# Patient Record
Sex: MALE | Race: WHITE | Employment: FULL TIME | URBAN - METROPOLITAN AREA
[De-identification: names, ages, dates, MRNs, and addresses within clinical notes are randomized per-mention and may not be internally consistent; named-entity substitution may affect disease eponyms.]

---

## 2017-06-05 ENCOUNTER — OFFICE VISIT - HEALTHEAST (OUTPATIENT)
Dept: INTERNAL MEDICINE | Facility: CLINIC | Age: 37
End: 2017-06-05

## 2017-06-05 DIAGNOSIS — G89.29 CHRONIC NECK PAIN: ICD-10-CM

## 2017-06-05 DIAGNOSIS — M54.2 CHRONIC NECK PAIN: ICD-10-CM

## 2017-06-05 ASSESSMENT — MIFFLIN-ST. JEOR: SCORE: 1703.68

## 2018-03-08 ENCOUNTER — OFFICE VISIT - HEALTHEAST (OUTPATIENT)
Dept: FAMILY MEDICINE | Facility: CLINIC | Age: 38
End: 2018-03-08

## 2018-03-08 DIAGNOSIS — J10.1 INFLUENZA B: ICD-10-CM

## 2018-03-08 DIAGNOSIS — R05.9 COUGH: ICD-10-CM

## 2018-03-08 LAB
FLUAV AG SPEC QL IA: ABNORMAL
FLUBV AG SPEC QL IA: ABNORMAL

## 2019-05-20 ENCOUNTER — OFFICE VISIT - HEALTHEAST (OUTPATIENT)
Dept: FAMILY MEDICINE | Facility: CLINIC | Age: 39
End: 2019-05-20

## 2019-05-20 DIAGNOSIS — Z20.818 EXPOSURE TO STREP THROAT: ICD-10-CM

## 2019-05-20 DIAGNOSIS — J02.9 ACUTE PHARYNGITIS, UNSPECIFIED ETIOLOGY: ICD-10-CM

## 2019-05-20 LAB — DEPRECATED S PYO AG THROAT QL EIA: NORMAL

## 2019-05-21 ENCOUNTER — COMMUNICATION - HEALTHEAST (OUTPATIENT)
Dept: FAMILY MEDICINE | Facility: CLINIC | Age: 39
End: 2019-05-21

## 2019-05-21 LAB — GROUP A STREP BY PCR: ABNORMAL

## 2021-05-24 ENCOUNTER — RECORDS - HEALTHEAST (OUTPATIENT)
Dept: ADMINISTRATIVE | Facility: CLINIC | Age: 41
End: 2021-05-24

## 2021-05-28 NOTE — PROGRESS NOTES
Walk In Care Note                                                                                 Date of Visit: 5/20/2019     Chief Complaint   Tu Mora is a(n) 38 y.o. White or  male who presents to Walk In [facility] with the following complaint(s):  Fever (x1 day) and Sore Throat       Assessment and Plan   1. Acute pharyngitis, unspecified etiology  - Rapid Strep A Screen-Throat swab  - amoxicillin (AMOXIL) 500 MG capsule; Take 1 capsule (500 mg total) by mouth 2 (two) times a day for 10 days. Take with food.  Dispense: 20 capsule; Refill: 0  - Group A Strep, RNA Direct Detection, Throat    2. Exposure to strep throat  - amoxicillin (AMOXIL) 500 MG capsule; Take 1 capsule (500 mg total) by mouth 2 (two) times a day for 10 days. Take with food.  Dispense: 20 capsule; Refill: 0      Strep screen negative; reflex testing in process. Discussed viral versus bacterial etiology of current symptoms. Constellation of symptoms and current examination findings are concerning for Group A Strep pharyngitis despite negative strep screen. Also has known exposure to strep (daughter was diagnosed earlier today). Discussed risks of antibiotic therapy, including diarrhea, allergic reaction, rash in the setting of possible viral illness, and treatment failure. Patient was agreeable to proceeding with antibiotic therapy. Treating with amoxicillin as listed above for presumed streptococcal pharyngitis. Recommended use of a probiotic due to risk of diarrhea. Discussed symptomatic / supportive cares.     Counseled patient regarding assessment and plan for evaluation and treatment. Questions were answered. See AVS for the specific written instructions and educational handout(s) regarding presumed strep that were provided at the conclusion of the visit.     Discussed signs / symptoms that warrant urgent / emergent medical attention.     Follow up as needed.      History of Present Illness   Primary symptom: Sore  throat  Onset: 1 day(s) ago  Progression: Worsening  Hoarseness: Yes  Dysphagia: Yes  Drooling: No  Neck swelling: No  Fevers: Yes  Chills: Yes  Headache: Yes  Rash: No  Abdominal pain: No  Upper respiratory symptoms: None  Additional symptoms: Body aches  Home therapies utilized: Ibuprofen  History of recurrent strep: No  History of peritonsillar abscess: No  Exposure to strep: Yes, daughter was diagnosed earlier today  Exposure to mono: No  Tobacco use / exposure: No     Review of Systems   Review of Systems   All other systems reviewed and are negative.       Physical Exam   Vitals:    05/20/19 1541   BP: 104/72   Patient Site: Right Arm   Patient Position: Sitting   Cuff Size: Adult Large   Pulse: 77   Resp: 16   Temp: 98.6  F (37  C)   TempSrc: Oral   SpO2: 96%   Weight: 190 lb (86.2 kg)     Physical Exam   Constitutional: He is oriented to person, place, and time. He appears well-developed and well-nourished.  Non-toxic appearance. No distress.   HENT:   Head: Normocephalic and atraumatic.   Right Ear: Tympanic membrane, external ear and ear canal normal.   Left Ear: Tympanic membrane, external ear and ear canal normal.   Nose: No mucosal edema or rhinorrhea.   Mouth/Throat: Uvula is midline and mucous membranes are normal. No oral lesions. Oropharyngeal exudate and posterior oropharyngeal erythema present. Tonsils are 2+ on the right. Tonsils are 2+ on the left. Tonsillar exudate.   Eyes: Conjunctivae and lids are normal.   Neck: Neck supple. No edema and no erythema present.   Cardiovascular: Normal rate, regular rhythm, S1 normal and S2 normal. Exam reveals no gallop and no friction rub.   No murmur heard.  Pulmonary/Chest: Effort normal and breath sounds normal. No stridor. He has no wheezes. He has no rhonchi. He has no rales.   Lymphadenopathy:        Head (right side): Tonsillar adenopathy present.        Head (left side): Tonsillar adenopathy present.     He has cervical adenopathy.        Right  cervical: Superficial cervical adenopathy present.        Left cervical: Superficial cervical adenopathy present.   Neurological: He is alert and oriented to person, place, and time.   Skin: Skin is warm and dry. No rash noted. No pallor.   Nursing note and vitals reviewed.       Diagnostic Studies   Laboratory:  Results for orders placed or performed in visit on 05/20/19   Rapid Strep A Screen-Throat swab   Result Value Ref Range    Rapid Strep A Antigen No Group A Strep detected, presumptive negative No Group A Strep detected, presumptive negative     Radiology:  N/A  Electrocardiogram:  N/A     Procedure Note   N/A     Pertinent History   The following portions of the patient's history were reviewed and updated as appropriate: allergies, current medications, past family history, past medical history, past social history, past surgical history and problem list.     Gustavo Schulte MD  [facility], [place]

## 2021-05-29 NOTE — TELEPHONE ENCOUNTER
----- Message from Spring Encinas CNP sent at 5/21/2019  1:03 PM CDT -----  Please let patient know overnight strep positive, but amoxicillin will cover.

## 2021-05-31 VITALS — HEIGHT: 67 IN | WEIGHT: 186.3 LBS | BODY MASS INDEX: 29.24 KG/M2

## 2021-06-01 VITALS — WEIGHT: 188 LBS | BODY MASS INDEX: 29.44 KG/M2

## 2021-06-03 VITALS — WEIGHT: 190 LBS | BODY MASS INDEX: 29.76 KG/M2

## 2021-06-11 NOTE — PROGRESS NOTES
"Chief Complaint   Patient presents with     Neck Pain     x 2 months worse past week, neck hurts all over       HISTORY OF PRESENT ILLNESS:  36-year-old male seen with bilateral and posterior neck pain.  States this has been a chronic recurrent complaint  Since approximately 2007 with the last 2 months chronic pain has worsened.  Denies recent trauma.  He has been taking ibuprofen which provides some relief.  He applies heat as needed.  Any movement of the neck including  Side bending, rotation, flexion and extension makes pain worse.  Pain does not radiate into his scalp.   denies pain into shoulders or either arm.    Allergies   Allergen Reactions     Cat Dander          History   Smoking Status     Never Smoker   Smokeless Tobacco     Not on file       Vitals:    06/05/17 1000   BP: 122/84   Pulse: 79   SpO2: 97%   Weight: 186 lb 4.8 oz (84.5 kg)   Height: 5' 7\" (1.702 m)       PHYSICAL EXAM:  GENERAL:Alert, respirations non-labored, no pain distress noted, speech clear  HEENT:Mouth and throat normal. TMs normal bilaterally. Eyes normal. Head atraumatic  NECK:No adenopathy. Diffuse tenderness laterally right and left and tenderness over most spinous processes of c-spine  CARDIOVASCULAR:Regular S1 and S2  LUNGS:Clear bilaterally       IMPRESSION:  Acute on chronic neck pain    PLAN:  Referral to physical therapy.  I suggested he be seen for recheck after 4 weeks  And if not responding to treatment consider MRI of cervical spine.  Continue heat.  Continue ibuprofen no more than 800 mg per dose no more than 4 times per day and only  As much as needed          "

## 2021-06-16 NOTE — PROGRESS NOTES
Assessment:     1. Cough  Influenza A/B Rapid Test   2. Influenza B  oseltamivir (TAMIFLU) 75 MG capsule     Results for orders placed or performed in visit on 03/08/18   Influenza A/B Rapid Test   Result Value Ref Range    Influenza  A, Rapid Antigen No Influenza A antigen detected No Influenza A antigen detected    Influenza B, Rapid Antigen Influenza B antigen detected (!) No Influenza B antigen detected        Plan:     -Discussed results with the patient.  With treat him for influenza with Tamiflu BID X 5 days.  Advised patient to take medications as prescribed.  Increase fluid intake.  May take ibuprofen or tylenol for pain or fever.  Go to ED if his symptoms worsens.      Subjective:       37 y.o. male presents for evaluation of sore throat, wheezing, and coughing.  Patient reports that he spiked a fever 102 yesterday.  His symptoms started Monday night with a fever and body aches started yesterday.  He denies nausea, vomiting, diarrhea, shortness of breath, or cough.  He denies difficulty with swallowing.  He has been using OTC medications with minimal symptom relief.  He reports that he was exposed to somebody at work who was sick but he did not know if she had influenza.      The following portions of the patient's history were reviewed and updated as appropriate: allergies, current medications, past family history, past medical history, past social history, past surgical history and problem list.    Review of Systems  A 12 point comprehensive review of systems was negative except as noted.     Objective:      /74 (Patient Site: Right Arm, Patient Position: Sitting, Cuff Size: Adult Large)  Pulse 82  Temp 99.6  F (37.6  C) (Oral)   Resp 20  Wt 188 lb (85.3 kg)  SpO2 98%  BMI 29.44 kg/m2  General appearance: alert, appears stated age, cooperative and moderate distress  Head: Normocephalic, without obvious abnormality, atraumatic, sinuses nontender to percussion  Eyes: conjunctivae/corneas clear.  PERRL, EOM's intact. Fundi benign.  Ears: normal TM's and external ear canals both ears  Nose: Nares normal. Septum midline. Mucosa normal. No drainage or sinus tenderness.  Throat: lips, mucosa, and tongue normal; teeth and gums normal  Neck: no adenopathy, no carotid bruit, no JVD, supple, symmetrical, trachea midline and thyroid not enlarged, symmetric, no tenderness/mass/nodules  Lungs: clear to auscultation bilaterally  Heart: regular rate and rhythm, S1, S2 normal, no murmur, click, rub or gallop  Skin: Skin color, texture, turgor normal. No rashes or lesions  Lymph nodes: Cervical, supraclavicular, and axillary nodes normal.  Neurologic: Grossly normal     This note has been dictated using voice recognition software. Any grammatical or context distortions are unintentional and inherent to the software

## 2021-06-17 NOTE — PATIENT INSTRUCTIONS - HE
Patient Instructions by Gustavo Schulte MD at 5/20/2019  3:40 PM     Author: Gustavo Schulte MD Service: -- Author Type: Physician    Filed: 5/20/2019  4:07 PM Encounter Date: 5/20/2019 Status: Addendum    : Gustavo Schulte MD (Physician)    Related Notes: Original Note by Gustavo Schulte MD (Physician) filed at 5/20/2019  4:04 PM       - Rapid strep test is negative.   - A confirmatory strep test is in process and will be finalized tomorrow. We will only reach out to you if this test is positive.   - Take the full course of amoxicillin as prescribed for presumed strep throat.   - Take a probiotic while taking this antibiotic. This can be purchased over the counter at your local pharmacy.   - Discard your toothbrush 48 hours after starting your antibiotic to prevent reinfection.   - Take acetaminophen and / or ibuprofen as needed for pain and fever.   - May return to  / school / work 24 hours after starting the antibiotic listed above.      Patient Education     Pharyngitis: Strep (Presumed)    You have pharyngitis (sore throat). The healthcare staff think your sore throat is caused by streptococcus (strep) bacteria. This is often called strep throat. Strep throat can cause throat pain that is worse when swallowing, aching all over, headache, and fever. The infection is contagious. It may be spread by coughing, kissing, or touching others after touching your mouth or nose. Antibiotic medicine is given to treat the infection.  Home care    Rest at home. Drink plenty of fluids so you wont get dehydrated.    Stay home from work or school for the first 2 days of taking the antibiotics. After this time, you will not be contagious. You can then return to work or school if you are feeling better.     Take the antibiotic medicine for the full 10 days, even when you feel better. This is very important to make sure the infection is fully treated. It is also important to prevent medicine-resistant  germs from growing. If you were given an antibiotic shot, no more antibiotics are needed.    You may use acetaminophen or ibuprofen to control pain or fever, unless another medicine was prescribed for this. If you have chronic liver or kidney disease or ever had a stomach ulcer or GI bleeding, talk with your healthcare provider before using these medicines.    Use throat lozenges or a throat-numbing spray to help reduce throat pain. Gargling with warm salt water can also help reduce throat pain. Dissolve 1/2 teaspoon of salt in 1 glass of warm water.     Dont eat salty or spicy foods. These can irritate the throat.  Follow-up care  Follow up with your healthcare provider or our staff if you don't get better over the next week.  When to seek medical advice  Call your healthcare provider right away if any of these occur:    Fever as directed by your healthcare provider    New or worse ear pain, sinus pain, or headache    Painful lumps in the back of neck    Stiff neck    Lymph nodes that get larger    Cant swallow liquids, a lot of drooling, or cant open mouth wide due to throat pain    Signs of dehydration, such as very dark urine or no urine, sunken eyes, dizziness    Trouble breathing or noisy breathing    Muffled voice    New rash  Prevention  Here are steps you can take to help prevent an infection:    Keep good hand washing habits.    Dont have close contact with people who have sore throats, colds, or other upper respiratory infections.    Dont smoke, and stay away from secondhand smoke.    Stay up to date with of your vaccines.  Date Last Reviewed: 11/1/2017 2000-2017 [employer]. [address] [place] [address], [place] [address]. All rights reserved. This information is not intended as a substitute for professional medical care. Always follow your healthcare professional's instructions.

## 2022-05-13 ENCOUNTER — OFFICE VISIT (OUTPATIENT)
Dept: FAMILY MEDICINE | Facility: CLINIC | Age: 42
End: 2022-05-13
Payer: OTHER GOVERNMENT

## 2022-05-13 VITALS
HEART RATE: 78 BPM | SYSTOLIC BLOOD PRESSURE: 118 MMHG | DIASTOLIC BLOOD PRESSURE: 70 MMHG | WEIGHT: 196.4 LBS | BODY MASS INDEX: 30.76 KG/M2

## 2022-05-13 DIAGNOSIS — M25.561 CHRONIC PAIN OF RIGHT KNEE: ICD-10-CM

## 2022-05-13 DIAGNOSIS — G89.29 CHRONIC PAIN OF RIGHT KNEE: ICD-10-CM

## 2022-05-13 DIAGNOSIS — K52.9 CHRONIC DIARRHEA OF UNKNOWN ORIGIN: ICD-10-CM

## 2022-05-13 DIAGNOSIS — M21.611 BUNION OF GREAT TOE OF RIGHT FOOT: ICD-10-CM

## 2022-05-13 DIAGNOSIS — H60.393 INFECTIVE OTITIS EXTERNA, BILATERAL: ICD-10-CM

## 2022-05-13 DIAGNOSIS — H65.93 BILATERAL SEROUS OTITIS MEDIA, UNSPECIFIED CHRONICITY: ICD-10-CM

## 2022-05-13 DIAGNOSIS — H66.002 ACUTE SUPPURATIVE OTITIS MEDIA OF LEFT EAR WITHOUT SPONTANEOUS RUPTURE OF TYMPANIC MEMBRANE, RECURRENCE NOT SPECIFIED: Primary | ICD-10-CM

## 2022-05-13 PROCEDURE — 99215 OFFICE O/P EST HI 40 MIN: CPT | Performed by: FAMILY MEDICINE

## 2022-05-13 RX ORDER — FLUTICASONE PROPIONATE 50 MCG
1 SPRAY, SUSPENSION (ML) NASAL 2 TIMES DAILY
Qty: 16 G | Refills: 0 | COMMUNITY
Start: 2022-05-13

## 2022-05-13 RX ORDER — AMOXICILLIN 500 MG/1
1000 CAPSULE ORAL 2 TIMES DAILY
Qty: 40 CAPSULE | Refills: 0 | Status: SHIPPED | OUTPATIENT
Start: 2022-05-13 | End: 2022-05-23

## 2022-05-13 RX ORDER — CIPROFLOXACIN AND DEXAMETHASONE 3; 1 MG/ML; MG/ML
4 SUSPENSION/ DROPS AURICULAR (OTIC) 2 TIMES DAILY
Qty: 7.5 ML | Refills: 0 | Status: SHIPPED | OUTPATIENT
Start: 2022-05-13 | End: 2022-05-20

## 2022-05-13 NOTE — PATIENT INSTRUCTIONS
Start Flonase1 spray in each nostril in am and bedtime      Start claritin ( loratidine) 10 mg  a day for next 30 days    Stat ciprodex drops 3-4 drops 2 times a day for 7 days    Start amoxicillin 1000 mg 2 times a day for 10 days     The [facility] service should call you the next 1 to 2 days if they have not called you- call them at the number noted     Call and schedule an appointment with gastroenterology    Start trying to eliminate foods from diet to see if symptoms improve       External Ear Infection (Adult)    External otitis (also called  swimmer s ear ) is an infection in the ear canal. It's often caused by bacteria or fungus. It can occur a few days after water gets trapped in the ear canal (from swimming or bathing). It can also occur after cleaning too deeply in the ear canal with a cotton swab or other object. Sometimes, hair care products get into the ear canal and cause this problem.   Symptoms can include pain, fever, itching, redness, drainage, or swelling of the ear canal. Temporary hearing loss may also occur.   Home care  Don't try to clean the ear canal. This can push pus and bacteria deeper into the canal.  Use prescribed ear drops as directed. These help reduce swelling and fight the infection. If an ear wick was placed in the ear canal, apply drops right onto the end of the wick. The wick will draw the medicine into the ear canal even if it's swollen closed.  A cotton ball may be loosely placed in the outer ear to absorb any drainage.  You may use over-the-counter medicines to control pain as directed by the healthcare provider, unless another medicine was prescribed. Talk with your provider before using these medicines if you have chronic liver or kidney disease or ever had a stomach ulcer or digestive tract bleeding.  Don't allow water to get into your ear when bathing. Also don't swim until the infection has cleared.     Prevention  Keep your ears dry. This helps lower the risk of  infection. Dry your ears with a towel or hair dryer after getting wet. Also, use ear plugs when swimming.  Don't stick any objects in the ear to remove wax.  Talk with your provider about using ear drops to prevent swimmer's ear in case you feel water trapped in your ear canal. You can get these drops over the counter at most drugstores. They work by removing water from the ear canal.     Follow-up care  Follow up with your healthcare provider in 1 week, or as advised.   When to seek medical advice  Call your healthcare provider right away if any of these occur:   Ear pain becomes worse or doesn t improve after 3 days of treatment  Redness or swelling of the outer ear occurs or gets worse  Headache  Fever of 100.4 F (38 C) or higher, or as directed by your healthcare provider  Call 911  Call 911 or get immediate medical care if any of the following occur:   Seizure  Unusual drowsiness or confusion  Unusual painful or stiff neck     [employer] last reviewed this educational content on 8/1/2020 2000-2021 [employer]. All rights reserved. This information is not intended as a substitute for professional medical care. Always follow your healthcare professional's instructions.            Serous otitis media, fluid behind the ear drum    Earaches can happen without an infection. They can occur when air and fluid build up behind the eardrum. They may cause a feeling of fullness and discomfort. They may also impair hearing. This is called otitis media with effusion (OME) or serous otitis media. It means there is fluid in the middle ear. It is not the same as acute otitis media, which is often from an infection.  OME can happen when you have a cold if congestion blocks the passage that drains the middle ear. This passage is called the eustachian tube. OME may also occur with nasal allergies or after a bacterial infection in the middle ear. Other causes are:  Trauma  Improper cleaning of wax from the  ear  Bacterial infection of the mastoid bone (mastoiditis)  Tumor  Jaw pain  Changes in pressure, such as from flying or scuba diving    The pain or discomfort may come and go. You may hear clicking or popping sounds when you chew or swallow. You may feel that your balance is off. Or you may hear ringing in the ear.  It often takes from several weeks up to 3 months for the fluid to clear on its own. Oral pain relievers and ear drops help if there is pain. Decongestants and antihistamines sometimes help. Antibiotics don't help since there is no infection. Your healthcare provider may give you a nasal spray to help reduce swelling in the nose and eustachian tube. This can allow the ear to drain.  If your OME doesn't get better after 3 months, surgery may be used to drain the fluid. A small tube may also be put in the eardrum to help with drainage.  Because the middle ear fluid can become infected, watch for signs of an infection. These may develop later. They may include increased ear pain, fever, or drainage from the ear.  Home care  These home-care tips will help you take care of yourself:  You may use over-the-counter medicine as directed by your healthcare provider to control pain, unless medicine was prescribed. If you have chronic liver or kidney disease or ever had a stomach ulcer or GI bleeding, talk with your healthcare provider before using any medicines.  Aspirin should never be used in anyone younger than age 18 who has a fever. It may cause severe liver damage.  Ask your healthcare provider if you may use over-the-counter decongestants such as phenylephrine or pseudoephedrine. Keep in mind they are not always helpful.  Talk with your healthcare provider about using nasal spray decongestants. Don't use them for more than 3 days, or as directed by your healthcare provider. Longer use can make congestion worse. Prescription nasal sprays from your healthcare provider don't often have such  restrictions.  Antihistamines may help if you are also having allergy symptoms.  You may use medicines such as guaifenesin to thin mucus and help with drainage.  Follow-up care  Follow up with your healthcare provider or as advised if you are not feeling better after 3 days.  When to seek medical advice  Call your healthcare provider right away if any of these occur:  Ear pain that gets worse or that does not start to get better   Fever of 100.4 F (38 C) or higher, or as directed by your healthcare provider  Fluid or blood draining from the ear  Headache or sinus pain  Stiff neck  Unusual drowsiness or confusion  [employer] last reviewed this educational content on 12/1/2019 2000-2021 [employer]. All rights reserved. This information is not intended as a substitute for professional medical care. Always follow your healthcare professional's instructions.     Middle Ear Infection (Adult)   You have an infection of the middle ear, the space behind the eardrum. This is also called acute otitis media (AOM). Sometimes it's caused by the common cold. This is because congestion can block the internal passage (eustachian tube) that drains fluid from the middle ear. When the middle ear fills with fluid, bacteria can grow there and cause an infection. Oral antibiotics are used to treat this illness, not ear drops. Symptoms usually start to improve within 1 to 2 days of treatment.    Home care  The following are general care guidelines:  Finish all of the antibiotic medicine given, even though you may feel better after the first few days.  You may use over-the-counter medicine, such as acetaminophen or ibuprofen, to control pain and fever, unless something else was prescribed. Talk with your healthcare provider before using these medicines if you have chronic liver or kidney disease. Also talk with your provider if you have had a stomach ulcer or digestive bleeding. Don't give aspirin to anyone under 18 years of  age who has a fever. It may cause severe illness or death.  Follow-up care  Follow up with your healthcare provider in 2 weeks, or as advised, if all symptoms have not gotten better, or if hearing doesn't go back to normal within 1 month.  When to seek medical advice  Call your healthcare provider right away if any of these occur:  Ear pain gets worse or does not improve after 3 days of treatment  Unusual drowsiness or confusion  Neck pain, stiff neck, or headache  Fluid or blood draining from the ear canal  Fever of 100.4 F (38 C) or as advised   Seizure  [employer] last reviewed this educational content on 9/1/2019 2000-2021 [employer]. All rights reserved. This information is not intended as a substitute for professional medical care. Always follow your healthcare professional's instructions.

## 2022-05-13 NOTE — PROGRESS NOTES
Patient presents with:  Ear Problem: Discharge, pain       Subjective     Tu Mora is a 41 year old male who presents to clinic today for the following health issues:    HPI       Ear problem       Duration: 2 weeks    Description (location/character/radiation): bilateral drainage,itching, and crusting at entrance of ear canal  No history of issues with ears, no history of PE tubes as a child, left is worse than right, hearing muffled off and on,     Intensity:  moderate    Accompanying signs and symptoms: as noted     History (similar episodes/previous evaluation): None    Precipitating or alleviating factors: None    Therapies tried and outcome: q tip to clear drainage      Vaccinated and booster   covid symptoms a week after  wife and daughter positive for covid 4/2022 (patient did not test) ( 4yr old daughter positive -not vaccinated )      R knee pain    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, injected knee steroids no history of knee pain prior to active duty issue with knee since that time   until flare of right knee-2 years ago and has progressive worsening running 1-2 miles a day-daily pain started in the knee, decrease in running frequency of running pain started, can go as much as 5 miles now, with pain, 2 days a week   After run-takes ibuprofen, pain resolves,   No pain at rest  Increase pain inclines and stairs-the worst pain, mild pain with walking and standing      In addition, right great toe pain onset during active duty 2007, no pain at rest, pain with running, dorsiflexion of foot,no history of evaluation of great toe/pain     GI issues     Loose stools, within an hour after eating onset 2007 during time on active duty, ongoing symptoms and now  worse in last 2 years,  No blood stools, no melena  Occasional imodium-helps   Changing diet-noted with fried foods, red meat more likely to have urgency, has not tried an elimination diet,      History of GERD, onset during time of active duty 2007, started on omeprazole, medication controlled symptoms resolved after honorable discharge active duty [employer] 2013   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  Social History     Tobacco Use     Smoking status: Never Smoker     Smokeless tobacco: Never Used   Substance Use Topics     Alcohol use: Yes     Comment: 5 drinks weekly       Current Outpatient Medications   Medication Sig Dispense Refill     fluticasone (FLONASE) 50 MCG/ACT nasal spray Spray 1 spray into both nostrils 2 times daily 16 g 0     ibuprofen 200 MG capsule Take 200 mg by mouth every 4 hours as needed for fever. (Patient not taking: Reported on 5/25/2022) 120 capsule      Allergies   Allergen Reactions     Cat Dander [Animal Dander] Unknown         ROS are negative, except as otherwise noted HPI      Objective    /70   Pulse 78   Wt 89.1 kg (196 lb 6.4 oz)   BMI 30.76 kg/m    Body mass index is 30.76 kg/m .  Physical Exam   GENERAL: healthy, alert and no distress  EYES: Eyes grossly normal to inspection, PERRL and conjunctivae and sclerae normal  HENT: ear canals-red,tender bilateral L>R  TM left-red, dull, TM on right air fluid level/dull , nose congestion and mouth without ulcers or lesions  NECK: no adenopathy, no asymmetry,  and thyroid normal to palpation  RESP: lungs clear to auscultation - no rales, rhonchi or wheezes  CV: regular rate and rhythm, normal S1 S2, no S3 or S4, no murmur, click or rub, no peripheral edema and peripheral pulses strong  ABDOMEN: soft, nontender, no hepatosplenomegaly, no masses and bowel sounds normal  MS: no gross musculoskeletal defects noted, no edema  R knee-mild chronic OA changes/mild swelling, tender along joint space, FROM, mild pain with full  flexion, minimal pain with full extension, no effusion, instability, ligaments intact   R foot- great toe -deformity MTP/bunion, tender, pain with ROM   NEURO: Normal strength and tone, mentation intact and speech normal, sensory exam grossly normal and gait normal     Diagnostic Test Results:  Labs reviewed in Epic  No results found for any visits on 05/13/22.        ASSESSMENT/PLAN:      ICD-10-CM    1. Acute suppurative otitis media of left ear without spontaneous rupture of tympanic membrane, recurrence not specified  H66.002 amoxicillin (AMOXIL) 500 MG capsule   2. Bilateral serous otitis media, unspecified chronicity  H65.93    3. Infective otitis externa, bilateral  H60.393 ciprofloxacin-dexamethasone (CIPRODEX) 0.3-0.1 % otic suspension   4. Chronic diarrhea of unknown origin  K52.9 Adult Gastro Ref - Consult Only   5. Chronic pain of right knee  M25.561 Orthopedic  Referral    G89.29    6. Bunion of great toe of right foot  M21.611 Orthopedic  Referral             Reviewed medication instructions and side effects. Follow up if experiences side effects.     I reviewed supportive care, otc meds to use if needed, expected course, and signs of concern.  Follow up as needed or if he does not improve within  1-2 days or if worsens in any way.  Reviewed red flag symptoms and is to go to the ER if experiences any of these.     The use of Dragon/Ybrain dictation services may have been used to construct the content in this note; any grammatical or spelling errors are non-intentional. Please contact the author of this note directly if you are in need of any clarification.      On the day of the encounter, time spend on chart review, patient visit, review of testing, documentation was 60  minutes          Patient Instructions   Start Flonase1 spray in each nostril in am and bedtime      Start claritin ( loratidine) 10 mg  a day for next 30 days    Stat ciprodex drops 3-4 drops 2 times a day for 7  days    Start amoxicillin 1000 mg 2 times a day for 10 days     The [facility] service should call you the next 1 to 2 days if they have not called you- call them at the number noted     Call and schedule an appointment with gastroenterology    Start trying to eliminate foods from diet to see if symptoms improve       External Ear Infection (Adult)    External otitis (also called  swimmer s ear ) is an infection in the ear canal. It's often caused by bacteria or fungus. It can occur a few days after water gets trapped in the ear canal (from swimming or bathing). It can also occur after cleaning too deeply in the ear canal with a cotton swab or other object. Sometimes, hair care products get into the ear canal and cause this problem.   Symptoms can include pain, fever, itching, redness, drainage, or swelling of the ear canal. Temporary hearing loss may also occur.   Home care    Don't try to clean the ear canal. This can push pus and bacteria deeper into the canal.    Use prescribed ear drops as directed. These help reduce swelling and fight the infection. If an ear wick was placed in the ear canal, apply drops right onto the end of the wick. The wick will draw the medicine into the ear canal even if it's swollen closed.    A cotton ball may be loosely placed in the outer ear to absorb any drainage.    You may use over-the-counter medicines to control pain as directed by the healthcare provider, unless another medicine was prescribed. Talk with your provider before using these medicines if you have chronic liver or kidney disease or ever had a stomach ulcer or digestive tract bleeding.    Don't allow water to get into your ear when bathing. Also don't swim until the infection has cleared.     Prevention    Keep your ears dry. This helps lower the risk of infection. Dry your ears with a towel or hair dryer after getting wet. Also, use ear plugs when swimming.    Don't stick any objects in the ear to remove  wax.    Talk with your provider about using ear drops to prevent swimmer's ear in case you feel water trapped in your ear canal. You can get these drops over the counter at most drugstores. They work by removing water from the ear canal.     Follow-up care  Follow up with your healthcare provider in 1 week, or as advised.   When to seek medical advice  Call your healthcare provider right away if any of these occur:     Ear pain becomes worse or doesn t improve after 3 days of treatment    Redness or swelling of the outer ear occurs or gets worse    Headache    Fever of 100.4 F (38 C) or higher, or as directed by your healthcare provider  Call 911  Call 911 or get immediate medical care if any of the following occur:     Seizure    Unusual drowsiness or confusion    Unusual painful or stiff neck     [employer] last reviewed this educational content on 8/1/2020 2000-2021 [employer]. All rights reserved. This information is not intended as a substitute for professional medical care. Always follow your healthcare professional's instructions.            Serous otitis media, fluid behind the ear drum    Earaches can happen without an infection. They can occur when air and fluid build up behind the eardrum. They may cause a feeling of fullness and discomfort. They may also impair hearing. This is called otitis media with effusion (OME) or serous otitis media. It means there is fluid in the middle ear. It is not the same as acute otitis media, which is often from an infection.  OME can happen when you have a cold if congestion blocks the passage that drains the middle ear. This passage is called the eustachian tube. OME may also occur with nasal allergies or after a bacterial infection in the middle ear. Other causes are:    Trauma    Improper cleaning of wax from the ear    Bacterial infection of the mastoid bone (mastoiditis)    Tumor    Jaw pain    Changes in pressure, such as from flying or scuba  diving    The pain or discomfort may come and go. You may hear clicking or popping sounds when you chew or swallow. You may feel that your balance is off. Or you may hear ringing in the ear.  It often takes from several weeks up to 3 months for the fluid to clear on its own. Oral pain relievers and ear drops help if there is pain. Decongestants and antihistamines sometimes help. Antibiotics don't help since there is no infection. Your healthcare provider may give you a nasal spray to help reduce swelling in the nose and eustachian tube. This can allow the ear to drain.  If your OME doesn't get better after 3 months, surgery may be used to drain the fluid. A small tube may also be put in the eardrum to help with drainage.  Because the middle ear fluid can become infected, watch for signs of an infection. These may develop later. They may include increased ear pain, fever, or drainage from the ear.  Home care  These home-care tips will help you take care of yourself:    You may use over-the-counter medicine as directed by your healthcare provider to control pain, unless medicine was prescribed. If you have chronic liver or kidney disease or ever had a stomach ulcer or GI bleeding, talk with your healthcare provider before using any medicines.    Aspirin should never be used in anyone younger than age 18 who has a fever. It may cause severe liver damage.    Ask your healthcare provider if you may use over-the-counter decongestants such as phenylephrine or pseudoephedrine. Keep in mind they are not always helpful.    Talk with your healthcare provider about using nasal spray decongestants. Don't use them for more than 3 days, or as directed by your healthcare provider. Longer use can make congestion worse. Prescription nasal sprays from your healthcare provider don't often have such restrictions.    Antihistamines may help if you are also having allergy symptoms.    You may use medicines such as guaifenesin to thin  mucus and help with drainage.  Follow-up care  Follow up with your healthcare provider or as advised if you are not feeling better after 3 days.  When to seek medical advice  Call your healthcare provider right away if any of these occur:    Ear pain that gets worse or that does not start to get better     Fever of 100.4 F (38 C) or higher, or as directed by your healthcare provider    Fluid or blood draining from the ear    Headache or sinus pain    Stiff neck    Unusual drowsiness or confusion  [employer] last reviewed this educational content on 12/1/2019 2000-2021 [employer]. All rights reserved. This information is not intended as a substitute for professional medical care. Always follow your healthcare professional's instructions.     Middle Ear Infection (Adult)   You have an infection of the middle ear, the space behind the eardrum. This is also called acute otitis media (AOM). Sometimes it's caused by the common cold. This is because congestion can block the internal passage (eustachian tube) that drains fluid from the middle ear. When the middle ear fills with fluid, bacteria can grow there and cause an infection. Oral antibiotics are used to treat this illness, not ear drops. Symptoms usually start to improve within 1 to 2 days of treatment.    Home care  The following are general care guidelines:    Finish all of the antibiotic medicine given, even though you may feel better after the first few days.    You may use over-the-counter medicine, such as acetaminophen or ibuprofen, to control pain and fever, unless something else was prescribed. Talk with your healthcare provider before using these medicines if you have chronic liver or kidney disease. Also talk with your provider if you have had a stomach ulcer or digestive bleeding. Don't give aspirin to anyone under 18 years of age who has a fever. It may cause severe illness or death.  Follow-up care  Follow up with your healthcare  provider in 2 weeks, or as advised, if all symptoms have not gotten better, or if hearing doesn't go back to normal within 1 month.  When to seek medical advice  Call your healthcare provider right away if any of these occur:    Ear pain gets worse or does not improve after 3 days of treatment    Unusual drowsiness or confusion    Neck pain, stiff neck, or headache    Fluid or blood draining from the ear canal    Fever of 100.4 F (38 C) or as advised     Seizure  [employer] last reviewed this educational content on 9/1/2019 2000-2021 [employer]. All rights reserved. This information is not intended as a substitute for professional medical care. Always follow your healthcare professional's instructions.

## 2022-05-25 ENCOUNTER — OFFICE VISIT (OUTPATIENT)
Dept: PODIATRY | Facility: CLINIC | Age: 42
End: 2022-05-25
Attending: FAMILY MEDICINE
Payer: OTHER GOVERNMENT

## 2022-05-25 VITALS — HEART RATE: 83 BPM | WEIGHT: 195 LBS | HEIGHT: 67 IN | BODY MASS INDEX: 30.61 KG/M2 | OXYGEN SATURATION: 97 %

## 2022-05-25 DIAGNOSIS — M20.10 HALLUX VALGUS, UNSPECIFIED LATERALITY: Primary | ICD-10-CM

## 2022-05-25 PROCEDURE — 99204 OFFICE O/P NEW MOD 45 MIN: CPT | Performed by: PODIATRIST

## 2022-05-25 ASSESSMENT — PAIN SCALES - GENERAL: PAINLEVEL: MILD PAIN (3)

## 2022-05-25 NOTE — PATIENT INSTRUCTIONS
What are Prescription Custom Orthotics?  Custom orthotics are specially-made devices designed to support and comfort your feet. Prescription orthotics are crafted for you and no one else. They match the contours of your feet precisely and are designed for the way you move. Orthotics are only manufactured after a podiatrist has conducted a complete evaluation of your feet, ankles, and legs, so the orthotic can accommodate your unique foot structure and pathology.  Prescription orthotics are divided into two categories:  Functional orthotics are designed to control abnormal motion. They may be used to treat foot pain caused by abnormal motion; they can also be used to treat injuries such as shin splints or tendinitis. Functional orthotics are usually crafted of a semi-rigid material such as plastic or graphite.  Accommodative orthotics are softer and meant to provide additional cushioning and support. They can be used to treat diabetic foot ulcers, painful calluses on the bottom of the foot, and other uncomfortable conditions.  Podiatrists use orthotics to treat foot problems such as plantar fasciitis, bursitis, tendinitis, diabetic foot ulcers, and foot, ankle, and heel pain. Clinical research studies have shown that podiatrist-prescribed foot orthotics decrease foot pain and improve function.  Orthotics typically cost more than shoe inserts purchased in a retail store, but the additional cost is usually well worth it. Unlike shoe inserts, orthotics are molded to fit each individual foot, so you can be sure that your orthotics fit and do what they're supposed to do. Prescription orthotics are also made of top-notch materials and last many years when cared for properly. Insurance often helps pay for prescription orthotics.  What are Shoe Inserts?   You've seen them at the grocery store and at the mall. You've probably even seen them on TV and online. Shoe inserts are any kind of non-prescription foot support designed  to be worn inside a shoe. Pre-packaged, mass produced, arch supports are shoe inserts. So are the  custom-made  insoles and foot supports that you can order online or at retail stores. Unless the device has been prescribed by a doctor and crafted for your specific foot, it's a shoe insert, not a custom orthotic device--despite what the ads might say.  Shoe inserts can be very helpful for a variety of foot ailments, including flat arches and foot and leg pain. They can cushion your feet, provide comfort, and support your arches, but they can't correct biomechanical foot problems or cure long-standing foot issues.  The most common types of shoe inserts are:  Arch supports: Some people have high arches. Others have low arches or flat feet. Arch supports generally have a  bumped-up  appearance and are designed to support the foot's natural arch.   Insoles: Insoles slip into your shoe to provide extra cushioning and support. Insoles are often made of gel, foam, or plastic.   Heel liners: Heel liners, sometimes called heel pads or heel cups, provide extra cushioning in the heel region. They may be especially useful for patients who have foot pain caused by age-related thinning of the heels' natural fat pads.   Foot cushions: Do your shoes rub against your heel or your toes? Foot cushions come in many different shapes and sizes and can be used as a barrier between you and your shoe.  Choosing an Over-the-Counter Shoe Insert  Selecting a shoe insert from the wide variety of devices on the market can be overwhelming. Here are some podiatrist-tested tips to help you find the insert that best meets your needs:  Consider your health. Do you have diabetes? Problems with circulation? An over-the-counter insert may not be your best bet. Diabetes and poor circulation increase your risk of foot ulcers and infections, so schedule an appointment with a podiatrist. He or she can help you select a solution that won't cause additional  health problems.   Think about the purpose. Are you planning to run a marathon, or do you just need a little arch support in your work shoes? Look for a product that fits your planned level of activity.   Bring your shoes. For the insert to be effective, it has to fit into your shoes. So bring your sneakers, dress shoes, or work boots--whatever you plan to wear with your insert. Look for an insert that will fit the contours of your shoe.   Try them on. If all possible, slip the insert into your shoe and try it out. Walk around a little. How does it feel? Don't assume that feelings of pressure will go away with continued wear. (If you can't try the inserts at the store, ask about the store's return policy and hold on to your receipt.)    Please call one of the [facility] locations below to schedule an appointment. If you received a prescription please bring it with you to your appointment. Some locations are limited to what they carry.    Office Locations    [facility]  [address] [place] [address]  Home Medical Equipment, [address]   Phone: [phone]   Orthotics and Prosthetics, [address]   Phone: [phone]    [facility]  [address]  [address]   [place] [address]   Phone: [phone]    [facility]ld[facility]  [address]  [place] [address]  Phone: [phone]    [facility] Medical [facility]ld[facility]   [address]  [place] [address]  Phone: [phone]    [facility]  [address]  [place] [address]  Phone: [phone]    [facility]  [address]  [place] [address]  Phone: [phone]    [place]   [address] [place] [address]   Phone: [phone]    WEARING YOUR CUSTOM FOOT ORTHOTICS   Most  insurance plans cover one pair of orthotics per year. You must check with your   insurance plan to see what your payment responsibility will be. Please call your   insurance company by calling the number on the back of your insurance card.   Orthotic's are non-refundable and non-returnable.   Orthotics are made of various designs. Some orthotics are covered with material that extends beyond your toes. If your orthotic is of this design, you will likely need to trim the toe end to get a proper fit. The insole from your shoe can be used as a template. Simply overlay the shoe insert on top of the custom orthotic. Align the heel end while tracing the length of the insert onto the custom orthotic. Use a large scissor to trim the toe end until you get a proper fit in the shoe.   The orthotic needs to be pushed as far back in the shoe as possible. The heel portion should not ride forward so as not to irritate your heel.   Orthotics are designed to work with socks. Excessive perspiration will shorten the life span of the orthotics. Remove the orthotic from the shoe frequently for proper drying.   The break-in period lasts for weeks. People new to orthotics will likely experience new aches and pains. The orthotic is forcing your foot into a new position. Arch, foot and leg muscle aches and fatigue are common during these weeks. Minor discomfort can be considered normal break in phenomenon. Start wearing your orthotic around your home your first day. Limited activity for one to two hours is recommended. You can increase one or two additional hours each day provided the aches and pains are subsiding. The degree of discomfort, fatigue and problems will dictate the speed of break in. You may require multiple weeks to work up to full time use.   Do not continue wearing your orthotics if they are creating problems such as blisters or sores. Do not hesitate to call the clinic to speak with a nurse regarding orthotic   break in,  fit, trimming, etc. You may also need to see the doctor if the orthotics are   simply not working out. Adjustments are sometimes made to improve orthotic   function.     Orthotics will only work in certain styles and types of shoes. Orthotics rarely work in dress shoes. Slip-ons, clogs, sandals and heels are particularly troublesome. Specially designed orthotics may be necessary for these types of shoes. Your custom orthotic was designed for activities that require appropriate walking or running shoes. Lace up athletic shoes, walking shoes or work boots should work appropriately. You may need a wider or longer shoe. Shoes with a removable  or insert work best. In general, you want to remove an insert from the shoe before placing the orthotic into the shoe. Shoes without a removable liner may not work as well.     When purchasing new shoes, bring your orthotics along to get a proper fit. Shop at stores that are familiar with orthotics.   Frequent washing of the orthotic may shorten the life span of the top cover. The top cover can be replaced but will generally last one to five years depending on use and foot perspiration.

## 2022-05-25 NOTE — LETTER
5/25/2022         RE: Tu Mora  [address] [place] [address]        Dear Colleague,    Thank you for referring your patient, Tu Mora, to the [facility]. Please see a copy of my visit note below.    FOOT AND ANKLE SURGERY/PODIATRY CONSULT NOTE         ASSESSMENT:   Hallux abductovalgus right foot      TREATMENT:  An x-ray of the right foot was taken today.  The x-rays were read and interpreted by this physician today.  I informed the patient he will require surgical correction of his painful bunion deformity in the form of a bunionectomy with first metatarsal osteotomy with internal screw fixation.  The patient was told the procedure is performed on an outpatient basis under local anesthesia with IV sedation.  He was told the procedure takes approximately 25 minutes to perform.  He would then be discharged weightbearing in a postop shoe for 1 month.  The patient stated he would have to delay his procedure until later in the summer.  When he is prepared to move forward with the surgical treatment plan he will contact the clinic at the appropriate time.      HPI: I was asked to see Tu Mora today to evaluate and treat a painful bunion involving the right foot the patient indicated that he has had this bunion for several years.  He has developed more pain in the big toe joint of the right foot over the past several months.  The pain is mild to moderate.  It is an aching type pain which is aggravated with weightbearing and ambulation.  He denies any trauma to the foot.  He has not had any swelling.  He does notice some mild redness surrounding the big toe joint right foot.  The pain is easily relieved with nonweightbearing.  He denies any previous treatment.  The patient was seen in consultation at the request of Lyndsay Moralez MD for evaluation and treatment of painful bunion right foot.     History reviewed. No pertinent past medical  history.    Social History     Socioeconomic History     Marital status: Single     Spouse name: Not on file     Number of children: 1     Years of education: Not on file     Highest education level: Not on file   Occupational History     Not on file   Tobacco Use     Smoking status: Never Smoker     Smokeless tobacco: Never Used   Substance and Sexual Activity     Alcohol use: Yes     Comment: 5 drinks weekly     Drug use: No     Sexual activity: Yes     Partners: Female   Other Topics Concern     Parent/sibling w/ CABG, MI or angioplasty before 65F 55M? No   Social History Narrative     Not on file     Social Determinants of Health     Financial Resource Strain: Not on file   Food Insecurity: Not on file   Transportation Needs: Not on file   Physical Activity: Not on file   Stress: Not on file   Social Connections: Not on file   Intimate Partner Violence: Not on file   Housing Stability: Not on file          Allergies   Allergen Reactions     Cat Dander [Animal Dander] Unknown          Current Outpatient Medications:      fluticasone (FLONASE) 50 MCG/ACT nasal spray, Spray 1 spray into both nostrils 2 times daily, Disp: 16 g, Rfl: 0     ibuprofen 200 MG capsule, Take 200 mg by mouth every 4 hours as needed for fever. (Patient not taking: Reported on 5/25/2022), Disp: 120 capsule, Rfl:      No family history on file.     Social History     Socioeconomic History     Marital status: Single     Spouse name: Not on file     Number of children: 1     Years of education: Not on file     Highest education level: Not on file   Occupational History     Not on file   Tobacco Use     Smoking status: Never Smoker     Smokeless tobacco: Never Used   Substance and Sexual Activity     Alcohol use: Yes     Comment: 5 drinks weekly     Drug use: No     Sexual activity: Yes     Partners: Female   Other Topics Concern     Parent/sibling w/ CABG, MI or angioplasty before 65F 55M? No   Social History Narrative     Not on file  "    Social Determinants of Health     Financial Resource Strain: Not on file   Food Insecurity: Not on file   Transportation Needs: Not on file   Physical Activity: Not on file   Stress: Not on file   Social Connections: Not on file   Intimate Partner Violence: Not on file   Housing Stability: Not on file        Review of Systems - Patient denies fever, chills, rash, wound, stiffness, limping, numbness, weakness, heart burn, blood in stool, chest pain with activity, calf pain when walking, shortness of breath with activity, chronic cough, easy bleeding/bruising, swelling of ankles, excessive thirst, fatigue, depression, anxiety.  Patient admits to painful bunion right foot.      OBJECTIVE:  Appearance: alert, well appearing, and in no distress.    Pulse 83   Ht 1.689 m (5' 6.5\")   Wt 88.5 kg (195 lb)   SpO2 97%   BMI 31.00 kg/m       Body mass index is 31 kg/m .     General appearance: Patient is alert and fully cooperative with history & exam.  No sign of distress is noted during the visit.  Psychiatric: Affect is pleasant & appropriate.  Patient appears motivated to improve health.  Respiratory: Breathing is regular & unlabored while sitting.  HEENT: Hearing is intact to spoken word.  Speech is clear.  No gross evidence of visual impairment that would impact ambulation.    Vascular: Dorsalis pedis and posterior tibial pulses are palpable. There is pedal hair growth bilaterally.  CFT < 3 sec from anterior tibial surface to distal digits bilaterally. There is no appreciable edema noted.  Dermatologic: Turgor and texture are within normal limits. No coloration or temperature changes. No primary or secondary lesions noted.  Neurologic: All epicritic and proprioceptive sensations are grossly intact bilaterally.  Musculoskeletal: All active and passive ankle, subtalar, midtarsal, and 1st MPJ range of motion are grossly intact without pain or crepitus, with the exception of none. Manual muscle strength is within " normal limits bilaterally. All dorsiflexors, plantarflexors, invertors, evertors are intact bilaterally. Tenderness present to the first metatarsal phalangeal joint right foot on palpation. Tenderness to the first metatarsal phalangeal joint right foot with range of motion.  There is a large firm palpable subcutaneous mass on the medial aspect of the head of the first metatarsal right foot.  There is mild lateral deviation of the hallux which buttresses the second toe right foot.  No crepitus on range of motion of the first MPJ right foot.  Calf is soft/non-tender without warmth/induration    Imaging:       No images are attached to the encounter or orders placed in the encounter.     No results found.   No results found.       Romel Lopez DPM  [facility] Foot & Ankle Surgery/Podiatry         Again, thank you for allowing me to participate in the care of your patient.        Sincerely,        Romel Mosquera DPM

## 2022-05-25 NOTE — PROGRESS NOTES
FOOT AND ANKLE SURGERY/PODIATRY CONSULT NOTE         ASSESSMENT:   Hallux abductovalgus right foot      TREATMENT:  An x-ray of the right foot was taken today.  The x-rays were read and interpreted by this physician today.  I informed the patient he will require surgical correction of his painful bunion deformity in the form of a bunionectomy with first metatarsal osteotomy with internal screw fixation.  The patient was told the procedure is performed on an outpatient basis under local anesthesia with IV sedation.  He was told the procedure takes approximately 25 minutes to perform.  He would then be discharged weightbearing in a postop shoe for 1 month.  The patient stated he would have to delay his procedure until later in the summer.  When he is prepared to move forward with the surgical treatment plan he will contact the clinic at the appropriate time.      HPI: I was asked to see Tu Mora today to evaluate and treat a painful bunion involving the right foot the patient indicated that he has had this bunion for several years.  He has developed more pain in the big toe joint of the right foot over the past several months.  The pain is mild to moderate.  It is an aching type pain which is aggravated with weightbearing and ambulation.  He denies any trauma to the foot.  He has not had any swelling.  He does notice some mild redness surrounding the big toe joint right foot.  The pain is easily relieved with nonweightbearing.  He denies any previous treatment.  The patient was seen in consultation at the request of Lyndsay Moralez MD for evaluation and treatment of painful bunion right foot.     History reviewed. No pertinent past medical history.    Social History     Socioeconomic History     Marital status: Single     Spouse name: Not on file     Number of children: 1     Years of education: Not on file     Highest education level: Not on file   Occupational History     Not on file   Tobacco Use      Smoking status: Never Smoker     Smokeless tobacco: Never Used   Substance and Sexual Activity     Alcohol use: Yes     Comment: 5 drinks weekly     Drug use: No     Sexual activity: Yes     Partners: Female   Other Topics Concern     Parent/sibling w/ CABG, MI or angioplasty before 65F 55M? No   Social History Narrative     Not on file     Social Determinants of Health     Financial Resource Strain: Not on file   Food Insecurity: Not on file   Transportation Needs: Not on file   Physical Activity: Not on file   Stress: Not on file   Social Connections: Not on file   Intimate Partner Violence: Not on file   Housing Stability: Not on file          Allergies   Allergen Reactions     Cat Dander [Animal Dander] Unknown          Current Outpatient Medications:      fluticasone (FLONASE) 50 MCG/ACT nasal spray, Spray 1 spray into both nostrils 2 times daily, Disp: 16 g, Rfl: 0     ibuprofen 200 MG capsule, Take 200 mg by mouth every 4 hours as needed for fever. (Patient not taking: Reported on 5/25/2022), Disp: 120 capsule, Rfl:      No family history on file.     Social History     Socioeconomic History     Marital status: Single     Spouse name: Not on file     Number of children: 1     Years of education: Not on file     Highest education level: Not on file   Occupational History     Not on file   Tobacco Use     Smoking status: Never Smoker     Smokeless tobacco: Never Used   Substance and Sexual Activity     Alcohol use: Yes     Comment: 5 drinks weekly     Drug use: No     Sexual activity: Yes     Partners: Female   Other Topics Concern     Parent/sibling w/ CABG, MI or angioplasty before 65F 55M? No   Social History Narrative     Not on file     Social Determinants of Health     Financial Resource Strain: Not on file   Food Insecurity: Not on file   Transportation Needs: Not on file   Physical Activity: Not on file   Stress: Not on file   Social Connections: Not on file   Intimate Partner Violence: Not on file  "  Housing Stability: Not on file        Review of Systems - Patient denies fever, chills, rash, wound, stiffness, limping, numbness, weakness, heart burn, blood in stool, chest pain with activity, calf pain when walking, shortness of breath with activity, chronic cough, easy bleeding/bruising, swelling of ankles, excessive thirst, fatigue, depression, anxiety.  Patient admits to painful bunion right foot.      OBJECTIVE:  Appearance: alert, well appearing, and in no distress.    Pulse 83   Ht 1.689 m (5' 6.5\")   Wt 88.5 kg (195 lb)   SpO2 97%   BMI 31.00 kg/m       Body mass index is 31 kg/m .     General appearance: Patient is alert and fully cooperative with history & exam.  No sign of distress is noted during the visit.  Psychiatric: Affect is pleasant & appropriate.  Patient appears motivated to improve health.  Respiratory: Breathing is regular & unlabored while sitting.  HEENT: Hearing is intact to spoken word.  Speech is clear.  No gross evidence of visual impairment that would impact ambulation.    Vascular: Dorsalis pedis and posterior tibial pulses are palpable. There is pedal hair growth bilaterally.  CFT < 3 sec from anterior tibial surface to distal digits bilaterally. There is no appreciable edema noted.  Dermatologic: Turgor and texture are within normal limits. No coloration or temperature changes. No primary or secondary lesions noted.  Neurologic: All epicritic and proprioceptive sensations are grossly intact bilaterally.  Musculoskeletal: All active and passive ankle, subtalar, midtarsal, and 1st MPJ range of motion are grossly intact without pain or crepitus, with the exception of none. Manual muscle strength is within normal limits bilaterally. All dorsiflexors, plantarflexors, invertors, evertors are intact bilaterally. Tenderness present to the first metatarsal phalangeal joint right foot on palpation. Tenderness to the first metatarsal phalangeal joint right foot with range of motion.  " There is a large firm palpable subcutaneous mass on the medial aspect of the head of the first metatarsal right foot.  There is mild lateral deviation of the hallux which buttresses the second toe right foot.  No crepitus on range of motion of the first MPJ right foot.  Calf is soft/non-tender without warmth/induration    Imaging:       No images are attached to the encounter or orders placed in the encounter.     No results found.   No results found.       Romel Lopez DPM  [facility] Foot & Ankle Surgery/Podiatry

## 2022-11-18 NOTE — TELEPHONE ENCOUNTER
REFERRAL INFORMATION:    Referring Provider:  Dr. Lyndsay Moralez     Referring Clinic:  [facility]     Reason for Visit/Diagnosis: Chronic diarrhea      FUTURE VISIT INFORMATION:    Appointment Date: 2/15/2023    Appointment Time: 4 PM      NOTES STATUS DETAILS   OFFICE NOTE from Referring Provider Internal 5/13/2022 Office visit with Dr. Moralez     OFFICE NOTE from Other Specialist N/A    HOSPITAL DISCHARGE SUMMARY/  ED VISITS N/A    OPERATIVE REPORT N/A    MEDICATION LIST Internal         ENDOSCOPY  N/A    COLONOSCOPY N/A    ERCP N/A    EUS N/A    STOOL TESTING N/A    PERTINENT LABS Internal    PATHOLOGY REPORTS (RELATED) N/A    IMAGING (CT, MRI, EGD, MRCP, Small Bowel Follow Through/SBT, MR/CT Enterography) N/A

## 2023-02-09 ENCOUNTER — TELEPHONE (OUTPATIENT)
Dept: GASTROENTEROLOGY | Facility: CLINIC | Age: 43
End: 2023-02-09
Payer: OTHER GOVERNMENT

## 2023-02-09 NOTE — TELEPHONE ENCOUNTER
Called to remind patient of their upcoming appointment with our GI clinic, on Wednesday 2/15/2023 at 3:45PM with Dr. Rosalind García. This appointment is scheduled as an in-person appt. Please arrive 15 minutes early to check in for your appointment. , if your appointment is virtual (video or telephone) you need to be in [place] for the visit. To reschedule or cancel patient to call [phone].    Jamaica Mahmood MA

## 2023-02-15 ENCOUNTER — OFFICE VISIT (OUTPATIENT)
Dept: GASTROENTEROLOGY | Facility: CLINIC | Age: 43
End: 2023-02-15
Attending: FAMILY MEDICINE
Payer: OTHER GOVERNMENT

## 2023-02-15 ENCOUNTER — PRE VISIT (OUTPATIENT)
Dept: GASTROENTEROLOGY | Facility: CLINIC | Age: 43
End: 2023-02-15

## 2023-02-15 VITALS
BODY MASS INDEX: 31.04 KG/M2 | HEART RATE: 71 BPM | OXYGEN SATURATION: 98 % | SYSTOLIC BLOOD PRESSURE: 130 MMHG | HEIGHT: 67 IN | DIASTOLIC BLOOD PRESSURE: 78 MMHG | WEIGHT: 197.8 LBS

## 2023-02-15 DIAGNOSIS — K52.9 CHRONIC DIARRHEA OF UNKNOWN ORIGIN: ICD-10-CM

## 2023-02-15 DIAGNOSIS — Z12.11 SPECIAL SCREENING FOR MALIGNANT NEOPLASMS, COLON: Primary | ICD-10-CM

## 2023-02-15 PROCEDURE — 99203 OFFICE O/P NEW LOW 30 MIN: CPT | Performed by: INTERNAL MEDICINE

## 2023-02-15 ASSESSMENT — ENCOUNTER SYMPTOMS
LOSS OF CONSCIOUSNESS: 0
BLOATING: 1
BLOOD IN STOOL: 0
SPEECH CHANGE: 0
MEMORY LOSS: 0
NUMBNESS: 0
SORE THROAT: 0
NECK MASS: 0
TASTE DISTURBANCE: 0
PARALYSIS: 0
SINUS CONGESTION: 0
DIARRHEA: 1
VOMITING: 0
NECK PAIN: 1
HOARSE VOICE: 0
ARTHRALGIAS: 1
BACK PAIN: 0
MUSCLE WEAKNESS: 0
HEARTBURN: 0
SEIZURES: 0
RECTAL PAIN: 0
TREMORS: 0
TINGLING: 0
MUSCLE CRAMPS: 0
HEADACHES: 1
JOINT SWELLING: 0
TROUBLE SWALLOWING: 0
SMELL DISTURBANCE: 0
ABDOMINAL PAIN: 1
SINUS PAIN: 0
NAUSEA: 0
DIZZINESS: 0
BOWEL INCONTINENCE: 0
JAUNDICE: 0
DISTURBANCES IN COORDINATION: 0
CONSTIPATION: 0
MYALGIAS: 1
WEAKNESS: 0
STIFFNESS: 1

## 2023-02-15 ASSESSMENT — PAIN SCALES - GENERAL: PAINLEVEL: NO PAIN (0)

## 2023-02-15 NOTE — NURSING NOTE
"Chief Complaint   Patient presents with     New Patient       Vitals:    02/15/23 1539   BP: 130/78   Pulse: 71   SpO2: 98%   Weight: 89.7 kg (197 lb 12.8 oz)   Height: 1.689 m (5' 6.5\")       Body mass index is 31.45 kg/m .    [employer]    "

## 2023-02-15 NOTE — LETTER
2/15/2023         RE: Tu Mora  [address] [place] [address]        Dear Colleague,    Thank you for referring your patient, Tu Mora, to the [facility] GASTROENTEROLOGY [facility]INIC [place]. Please see a copy of my visit note below.    GASTROENTEROLOGY NEW PATIENT    CC/REFERRING MD:    Naida Ortiz    REASON FOR CONSULTATION:   Lyndsay Moralez for   Chief Complaint   Patient presents with     New Patient       HISTORY OF PRESENT ILLNESS:    Tu Mora is a 42 year old male who is being evaluated today in GI clinic.    Since 2010 takes imodium regularly prn or prior to an outside event to avoid unexpected trip to the bathroom.    He feels he has had a touchy digestive system for a long time.  Has a sense of urgency, and loose stools.  It is rare he has a formed BM.  Used to get more heartburn in 2002 had a severe GERD attack, used PPI, improved, now does not feel he needs a scheduled PPI.  Denies: unintentional weight loss, blood in stool.    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    PERTINENT PAST MEDICAL HISTORY:  (I personally reviewed this history with the patient at today's visit)   Past Medical History:   Diagnosis Date     GERD (gastroesophageal reflux disease) 10/3/2013         PREVIOUS SURGERIES: (I personally reviewed this history with the patient at today's visit)   Past Surgical History:   Procedure Laterality Date     SEPTOPLASTY       WISDOM TOOTH EXTRACTION         ALLERGIES:     Allergies   Allergen Reactions     Cat Dander [Animal Dander] Unknown     Pollen Extract Other (See Comments)       PERTINENT MEDICATIONS:    Current Outpatient Medications:       "ibuprofen 200 MG capsule, Take 200 mg by mouth every 4 hours as needed for fever, Disp: 120 capsule, Rfl:      fluticasone (FLONASE) 50 MCG/ACT nasal spray, Spray 1 spray into both nostrils 2 times daily, Disp: 16 g, Rfl: 0    SOCIAL HISTORY:  Social History     Occupational History     Not on file   Tobacco Use     Smoking status: Never     Smokeless tobacco: Never   Substance and Sexual Activity     Alcohol use: Yes     Comment: 5 drinks weekly     Drug use: No     Sexual activity: Yes     Partners: Female       FAMILY HISTORY:   Family History   Problem Relation Age of Onset     Sleep Apnea Mother      Hypertension Father         Review of Systems  Answers for HPI/ROS submitted by the patient on 2/15/2023  General Symptoms: No  Skin Symptoms: No  HENT Symptoms: Yes  EYE SYMPTOMS: No  HEART SYMPTOMS: No  LUNG SYMPTOMS: No  INTESTINAL SYMPTOMS: Yes  URINARY SYMPTOMS: No  REPRODUCTIVE SYMPTOMS: No  SKELETAL SYMPTOMS: Yes  BLOOD SYMPTOMS: No  NERVOUS SYSTEM SYMPTOMS: Yes  MENTAL HEALTH SYMPTOMS: No  Congestion: No  Trouble swallowing: No   Voice hoarseness: No  Mouth sores: No  Tooth pain: No  Gum tenderness: No  Bleeding gums: No  Change in taste: No  Change in sense of smell: No  Dry mouth: No  Hearing aid used: No  Neck lump: No  Bloating: Yes  Rectal or Anal pain: No  Fecal incontinence: No  Yellowing of skin or eyes: No  Vomit with blood: No  Change in stools: Yes  Swollen joints: No  Joint pain: Yes  Bone pain: No  Muscle cramps: No  Muscle weakness: No  Joint stiffness: Yes  Bone fracture: No  Trouble with coordination: No  Difficulty walking: No  Paralysis: No  Numbness: No      Exam:    /78   Pulse 71   Ht 1.689 m (5' 6.5\")   Wt 89.7 kg (197 lb 12.8 oz)   SpO2 98%   BMI 31.45 kg/m      Physical Exam  Constitutional:       General: He is not in acute distress.     Appearance: Normal appearance.   HENT:      Head: Normocephalic and atraumatic.      Nose: Nose normal.      Mouth/Throat:      Mouth: " Mucous membranes are moist.      Pharynx: Oropharynx is clear.   Eyes:      General: No scleral icterus.     Extraocular Movements: Extraocular movements intact.      Pupils: Pupils are equal, round, and reactive to light.   Cardiovascular:      Rate and Rhythm: Normal rate and regular rhythm.      Heart sounds: No murmur heard.    No friction rub. No gallop.   Pulmonary:      Effort: Pulmonary effort is normal.      Breath sounds: Normal breath sounds. No wheezing, rhonchi or rales.   Abdominal:      General: Abdomen is flat. There is no distension.      Palpations: Abdomen is soft. There is no mass.      Tenderness: There is no abdominal tenderness.   Musculoskeletal:         General: No swelling. Normal range of motion.      Cervical back: Normal range of motion. No rigidity.   Lymphadenopathy:      Cervical: No cervical adenopathy.   Skin:     General: Skin is warm and dry.      Findings: No rash.   Neurological:      General: No focal deficit present.      Mental Status: He is alert and oriented to person, place, and time.   Psychiatric:         Mood and Affect: Mood normal.         Behavior: Behavior normal.         Thought Content: Thought content normal.         Judgment: Judgment normal.          PERTINENT STUDIES have been reviewed.    Impression/Recommendations:  Tu Mora is a 42 year old male who presents for evaluation of chronic intermittent diarrhea.  ddx includes IBS vs celiac vs thyroid dx vs SIBO vs other.  No alarm symptoms.  Patient will be due screening colonoscopy in July.  -- screening colonoscopy in July, bx for incidental diarrhea  -- labs for eval above, patient is relatively gluten free due to family member with celiac in household, plan for labs in 3 weeks after gluten challenge (2 slices bread a day or equivalent)      Rosalind García MD    RTC after above complete    Thank you for this consultation.  It was a pleasure to participate in the care of this patient; please contact  us with any further questions.

## 2023-02-17 ENCOUNTER — OFFICE VISIT (OUTPATIENT)
Dept: FAMILY MEDICINE | Facility: CLINIC | Age: 43
End: 2023-02-17
Payer: OTHER GOVERNMENT

## 2023-02-17 VITALS
DIASTOLIC BLOOD PRESSURE: 68 MMHG | SYSTOLIC BLOOD PRESSURE: 118 MMHG | WEIGHT: 198 LBS | BODY MASS INDEX: 31.08 KG/M2 | HEIGHT: 67 IN | HEART RATE: 76 BPM

## 2023-02-17 DIAGNOSIS — Z76.89 ENCOUNTER TO ESTABLISH CARE: Primary | ICD-10-CM

## 2023-02-17 DIAGNOSIS — Z13.220 SCREENING FOR HYPERLIPIDEMIA: ICD-10-CM

## 2023-02-17 DIAGNOSIS — Z11.59 NEED FOR HEPATITIS C SCREENING TEST: ICD-10-CM

## 2023-02-17 DIAGNOSIS — M21.611 BUNION, RIGHT: ICD-10-CM

## 2023-02-17 DIAGNOSIS — M21.612 BUNION, LEFT: ICD-10-CM

## 2023-02-17 DIAGNOSIS — Z30.2 ENCOUNTER FOR VASECTOMY: ICD-10-CM

## 2023-02-17 PROBLEM — Z11.4 SCREENING FOR HIV (HUMAN IMMUNODEFICIENCY VIRUS): Status: ACTIVE | Noted: 2023-02-17

## 2023-02-17 PROBLEM — Z11.4 SCREENING FOR HIV (HUMAN IMMUNODEFICIENCY VIRUS): Status: RESOLVED | Noted: 2023-02-17 | Resolved: 2023-02-17

## 2023-02-17 PROCEDURE — 90471 IMMUNIZATION ADMIN: CPT | Performed by: PHYSICIAN ASSISTANT

## 2023-02-17 PROCEDURE — 99213 OFFICE O/P EST LOW 20 MIN: CPT | Mod: 25 | Performed by: PHYSICIAN ASSISTANT

## 2023-02-17 PROCEDURE — 90715 TDAP VACCINE 7 YRS/> IM: CPT | Performed by: PHYSICIAN ASSISTANT

## 2023-02-17 NOTE — PROGRESS NOTES
"  Assessment & Plan       Need for hepatitis C screening test  - Hepatitis C Screen Reflex to HCV RNA Quant and Genotype; Future    Screening for hyperlipidemia  - Lipid panel reflex to direct LDL Non-fasting; Future    Encounter to establish care    Bunion, left  - Orthopedic  Referral; Future    Bunion, right  - Orthopedic  Referral; Future    Encounter for vasectomy  - Adult Urology  Referral; Future    Will get lab work at [facility] with other ordered labs      BMI:   Estimated body mass index is 31.48 kg/m  as calculated from the following:    Height as of this encounter: 1.689 m (5' 6.5\").    Weight as of this encounter: 89.8 kg (198 lb).   Weight management plan: Patient was referred to their PCP to discuss a diet and exercise plan.        Return for Routine preventive.    Naida Ortiz PA-C  [facility]    Leeroy Mae is a 42 year old, presenting for the following health issues:  Establish Care and Bunion (Both feet)      History of Present Illness       Reason for visit:  Establish care    He eats 2-3 servings of fruits and vegetables daily.He consumes 1 sweetened beverage(s) daily.He exercises with enough effort to increase his heart rate 20 to 29 minutes per day.  He exercises with enough effort to increase his heart rate 4 days per week.   He is taking medications regularly.     Establish care-     Ear canal pruritis-occasional drainage, no change in hearing.  Mild pain occasionally.      Bunion- bilaterally, has pain associated, saw podiatry last year and surgery was suggested, has not scheduled.     Considering vasectomy, would like referral to urology          Review of Systems   Constitutional, HEENT, cardiovascular, pulmonary, gi and gu systems are negative, except as otherwise noted.      Objective    /68 (BP Location: Left arm, Patient Position: Sitting, Cuff Size: Adult Regular)   Pulse 76   Ht 1.689 m (5' 6.5\")   Wt " 89.8 kg (198 lb)   BMI 31.48 kg/m    Body mass index is 31.48 kg/m .  Physical Exam   GENERAL: healthy, alert and no distress  NECK: no adenopathy, no asymmetry, masses, or scars and thyroid normal to palpation  HEENT: ear canals and tms wnl  RESP: lungs clear to auscultation - no rales, rhonchi or wheezes  CV: regular rate and rhythm, normal S1 S2, no S3 or S4, no murmur, click or rub, no peripheral edema and peripheral pulses strong  MS: no gross musculoskeletal defects noted, no edema

## 2023-02-23 ENCOUNTER — TELEPHONE (OUTPATIENT)
Dept: GASTROENTEROLOGY | Facility: CLINIC | Age: 43
End: 2023-02-23
Payer: OTHER GOVERNMENT

## 2023-02-23 NOTE — TELEPHONE ENCOUNTER
"Attempted patient (1st attempt)-unable to LVM as mailbox not set up. Unable to send mychart as it is pending.     Schedule:  \"Return GI\" visit with Dr. García for 6 mo follow up (around 8/23/22)      "

## 2023-02-23 NOTE — PROGRESS NOTES
GASTROENTEROLOGY NEW PATIENT    CC/REFERRING MD:    Naida Ortiz    REASON FOR CONSULTATION:   Lyndsay Moralez for   Chief Complaint   Patient presents with     New Patient       HISTORY OF PRESENT ILLNESS:    Tu Mora is a 42 year old male who is being evaluated today in GI clinic.    Since 2010 takes imodium regularly prn or prior to an outside event to avoid unexpected trip to the bathroom.    He feels he has had a touchy digestive system for a long time.  Has a sense of urgency, and loose stools.  It is rare he has a formed BM.  Used to get more heartburn in 2002 had a severe GERD attack, used PPI, improved, now does not feel he needs a scheduled PPI.  Denies: unintentional weight loss, blood in stool.    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    PERTINENT PAST MEDICAL HISTORY:  (I personally reviewed this history with the patient at today's visit)   Past Medical History:   Diagnosis Date     GERD (gastroesophageal reflux disease) 10/3/2013         PREVIOUS SURGERIES: (I personally reviewed this history with the patient at today's visit)   Past Surgical History:   Procedure Laterality Date     SEPTOPLASTY       WISDOM TOOTH EXTRACTION         ALLERGIES:     Allergies   Allergen Reactions     Cat Dander [Animal Dander] Unknown     Pollen Extract Other (See Comments)       PERTINENT MEDICATIONS:    Current Outpatient Medications:      ibuprofen 200 MG capsule, Take 200 mg by mouth every 4 hours as needed for fever, Disp: 120 capsule, Rfl:      fluticasone (FLONASE) 50 MCG/ACT nasal spray, Spray 1 spray into both nostrils 2 times daily, Disp: 16 g, Rfl: 0    SOCIAL HISTORY:  Social History     Occupational  "History     Not on file   Tobacco Use     Smoking status: Never     Smokeless tobacco: Never   Substance and Sexual Activity     Alcohol use: Yes     Comment: 5 drinks weekly     Drug use: No     Sexual activity: Yes     Partners: Female       FAMILY HISTORY:   Family History   Problem Relation Age of Onset     Sleep Apnea Mother      Hypertension Father         Review of Systems  Answers for HPI/ROS submitted by the patient on 2/15/2023  General Symptoms: No  Skin Symptoms: No  HENT Symptoms: Yes  EYE SYMPTOMS: No  HEART SYMPTOMS: No  LUNG SYMPTOMS: No  INTESTINAL SYMPTOMS: Yes  URINARY SYMPTOMS: No  REPRODUCTIVE SYMPTOMS: No  SKELETAL SYMPTOMS: Yes  BLOOD SYMPTOMS: No  NERVOUS SYSTEM SYMPTOMS: Yes  MENTAL HEALTH SYMPTOMS: No  Congestion: No  Trouble swallowing: No   Voice hoarseness: No  Mouth sores: No  Tooth pain: No  Gum tenderness: No  Bleeding gums: No  Change in taste: No  Change in sense of smell: No  Dry mouth: No  Hearing aid used: No  Neck lump: No  Bloating: Yes  Rectal or Anal pain: No  Fecal incontinence: No  Yellowing of skin or eyes: No  Vomit with blood: No  Change in stools: Yes  Swollen joints: No  Joint pain: Yes  Bone pain: No  Muscle cramps: No  Muscle weakness: No  Joint stiffness: Yes  Bone fracture: No  Trouble with coordination: No  Difficulty walking: No  Paralysis: No  Numbness: No      Exam:    /78   Pulse 71   Ht 1.689 m (5' 6.5\")   Wt 89.7 kg (197 lb 12.8 oz)   SpO2 98%   BMI 31.45 kg/m      Physical Exam  Constitutional:       General: He is not in acute distress.     Appearance: Normal appearance.   HENT:      Head: Normocephalic and atraumatic.      Nose: Nose normal.      Mouth/Throat:      Mouth: Mucous membranes are moist.      Pharynx: Oropharynx is clear.   Eyes:      General: No scleral icterus.     Extraocular Movements: Extraocular movements intact.      Pupils: Pupils are equal, round, and reactive to light.   Cardiovascular:      Rate and Rhythm: Normal rate " and regular rhythm.      Heart sounds: No murmur heard.    No friction rub. No gallop.   Pulmonary:      Effort: Pulmonary effort is normal.      Breath sounds: Normal breath sounds. No wheezing, rhonchi or rales.   Abdominal:      General: Abdomen is flat. There is no distension.      Palpations: Abdomen is soft. There is no mass.      Tenderness: There is no abdominal tenderness.   Musculoskeletal:         General: No swelling. Normal range of motion.      Cervical back: Normal range of motion. No rigidity.   Lymphadenopathy:      Cervical: No cervical adenopathy.   Skin:     General: Skin is warm and dry.      Findings: No rash.   Neurological:      General: No focal deficit present.      Mental Status: He is alert and oriented to person, place, and time.   Psychiatric:         Mood and Affect: Mood normal.         Behavior: Behavior normal.         Thought Content: Thought content normal.         Judgment: Judgment normal.          PERTINENT STUDIES have been reviewed.    Impression/Recommendations:  Tu Mora is a 42 year old male who presents for evaluation of chronic intermittent diarrhea.  ddx includes IBS vs celiac vs thyroid dx vs SIBO vs other.  No alarm symptoms.  Patient will be due screening colonoscopy in July.  -- screening colonoscopy in July, bx for incidental diarrhea  -- labs for eval above, patient is relatively gluten free due to family member with celiac in household, plan for labs in 3 weeks after gluten challenge (2 slices bread a day or equivalent)      Rosalind García MD    RTC after above complete    Thank you for this consultation.  It was a pleasure to participate in the care of this patient; please contact us with any further questions.

## 2023-02-24 NOTE — TELEPHONE ENCOUNTER
Called and spoke with pt's spouse, she will have the patient call back and schedule a follow up with Dr. García in 6 months

## 2023-03-07 ENCOUNTER — LAB (OUTPATIENT)
Dept: LAB | Facility: CLINIC | Age: 43
End: 2023-03-07
Payer: OTHER GOVERNMENT

## 2023-03-07 DIAGNOSIS — Z11.4 SCREENING FOR HIV (HUMAN IMMUNODEFICIENCY VIRUS): Primary | ICD-10-CM

## 2023-03-07 DIAGNOSIS — Z11.59 NEED FOR HEPATITIS C SCREENING TEST: ICD-10-CM

## 2023-03-07 DIAGNOSIS — Z13.220 SCREENING FOR HYPERLIPIDEMIA: ICD-10-CM

## 2023-03-07 DIAGNOSIS — K52.9 CHRONIC DIARRHEA OF UNKNOWN ORIGIN: ICD-10-CM

## 2023-03-07 LAB
ALBUMIN SERPL BCG-MCNC: 4.5 G/DL (ref 3.5–5.2)
ALP SERPL-CCNC: 78 U/L (ref 40–129)
ALT SERPL W P-5'-P-CCNC: 88 U/L (ref 10–50)
ANION GAP SERPL CALCULATED.3IONS-SCNC: 11 MMOL/L (ref 7–15)
AST SERPL W P-5'-P-CCNC: 39 U/L (ref 10–50)
BILIRUB SERPL-MCNC: 0.7 MG/DL
BUN SERPL-MCNC: 20 MG/DL (ref 6–20)
CALCIUM SERPL-MCNC: 9.4 MG/DL (ref 8.6–10)
CHLORIDE SERPL-SCNC: 104 MMOL/L (ref 98–107)
CHOLEST SERPL-MCNC: 246 MG/DL
CREAT SERPL-MCNC: 0.92 MG/DL (ref 0.67–1.17)
DEPRECATED HCO3 PLAS-SCNC: 25 MMOL/L (ref 22–29)
ERYTHROCYTE [DISTWIDTH] IN BLOOD BY AUTOMATED COUNT: 11.9 % (ref 10–15)
GFR SERPL CREATININE-BSD FRML MDRD: >90 ML/MIN/1.73M2
GLUCOSE SERPL-MCNC: 94 MG/DL (ref 70–99)
HCT VFR BLD AUTO: 45.4 % (ref 40–53)
HCV AB SERPL QL IA: NONREACTIVE
HDLC SERPL-MCNC: 38 MG/DL
HGB BLD-MCNC: 15 G/DL (ref 13.3–17.7)
HIV 1+2 AB+HIV1 P24 AG SERPL QL IA: NONREACTIVE
LDLC SERPL CALC-MCNC: 179 MG/DL
MCH RBC QN AUTO: 30.1 PG (ref 26.5–33)
MCHC RBC AUTO-ENTMCNC: 33 G/DL (ref 31.5–36.5)
MCV RBC AUTO: 91 FL (ref 78–100)
NONHDLC SERPL-MCNC: 208 MG/DL
PLATELET # BLD AUTO: 184 10E3/UL (ref 150–450)
POTASSIUM SERPL-SCNC: 4.4 MMOL/L (ref 3.4–5.3)
PROT SERPL-MCNC: 7.1 G/DL (ref 6.4–8.3)
RBC # BLD AUTO: 4.98 10E6/UL (ref 4.4–5.9)
SODIUM SERPL-SCNC: 140 MMOL/L (ref 136–145)
TRIGL SERPL-MCNC: 147 MG/DL
TSH SERPL DL<=0.005 MIU/L-ACNC: 1.89 UIU/ML (ref 0.3–4.2)
WBC # BLD AUTO: 6.4 10E3/UL (ref 4–11)

## 2023-03-07 PROCEDURE — 87389 HIV-1 AG W/HIV-1&-2 AB AG IA: CPT

## 2023-03-07 PROCEDURE — 86803 HEPATITIS C AB TEST: CPT

## 2023-03-07 PROCEDURE — 85027 COMPLETE CBC AUTOMATED: CPT

## 2023-03-07 PROCEDURE — 36415 COLL VENOUS BLD VENIPUNCTURE: CPT

## 2023-03-07 PROCEDURE — 86364 TISS TRNSGLTMNASE EA IG CLAS: CPT

## 2023-03-07 PROCEDURE — 80061 LIPID PANEL: CPT

## 2023-03-07 PROCEDURE — 82784 ASSAY IGA/IGD/IGG/IGM EACH: CPT

## 2023-03-07 PROCEDURE — 80053 COMPREHEN METABOLIC PANEL: CPT

## 2023-03-07 PROCEDURE — 84443 ASSAY THYROID STIM HORMONE: CPT

## 2023-03-07 NOTE — LETTER
March 14, 2023      Tu Mora  [address] [place] [address]        Dear ,    We are writing to inform you of your test results.    I am helping cover for Naida today.     Your total cholesterol returned as elevated. I generally like your total cholesterol to be less than 200. Regular exercise and a low fat diet should help this improve.     Your HDL (good or protective cholesterol) was low. This can be genetic and can be increased by doing regular physical activity if not already doing so.     The other labs were normal.       Let me know if you have questions or concerns!       Resulted Orders   Hepatitis C Screen Reflex to HCV RNA Quant and Genotype   Result Value Ref Range    Hepatitis C Antibody Nonreactive Nonreactive    Narrative    Assay performance characteristics have not been established for newborns, infants, and children.   Lipid panel reflex to direct LDL Non-fasting   Result Value Ref Range    Cholesterol 246 (H) <200 mg/dL    Triglycerides 147 <150 mg/dL    Direct Measure HDL 38 (L) >=40 mg/dL    LDL Cholesterol Calculated 179 (H) <=100 mg/dL    Non HDL Cholesterol 208 (H) <130 mg/dL    Narrative    Cholesterol  Desirable:  <200 mg/dL    Triglycerides  Normal:  Less than 150 mg/dL  Borderline High:  150-199 mg/dL  High:  200-499 mg/dL  Very High:  Greater than or equal to 500 mg/dL    Direct Measure HDL  Female:  Greater than or equal to 50 mg/dL   Male:  Greater than or equal to 40 mg/dL    LDL Cholesterol  Desirable:  <100mg/dL  Above Desirable:  100-129 mg/dL   Borderline High:  130-159 mg/dL   High:  160-189 mg/dL   Very High:  >= 190 mg/dL    Non HDL Cholesterol  Desirable:  130 mg/dL  Above Desirable:  130-159 mg/dL  Borderline High:  160-189 mg/dL  High:  190-219 mg/dL  Very High:  Greater than or equal to 220 mg/dL   HIV Antigen Antibody Combo   Result Value Ref Range    HIV Antigen Antibody Combo Nonreactive Nonreactive      Comment:      HIV-1 p24 Ag & HIV-1/HIV-2  Ab Not Detected       If you have any questions or concerns, please call the clinic at the number listed above.       Sincerely,      Jerome Walters MD   Internal Medicine and Pediatrics

## 2023-03-08 LAB
IGA SERPL-MCNC: 290 MG/DL (ref 84–499)
TTG IGA SER-ACNC: 0.8 U/ML
TTG IGG SER-ACNC: <0.6 U/ML

## 2023-03-09 ENCOUNTER — TELEPHONE (OUTPATIENT)
Dept: GASTROENTEROLOGY | Facility: CLINIC | Age: 43
End: 2023-03-09
Payer: OTHER GOVERNMENT

## 2023-03-09 NOTE — TELEPHONE ENCOUNTER
----- Message from Rosalind García MD sent at 3/9/2023 10:32 AM CST -----  Regarding: lab results  Hi, can you write a letter to this patient with his lab results see below and also order a hepatic panel in 6 weeks. thank you Rosalind            The lab tests show an elevated liver test.  For now the plan will be to recheck this in 6 weeks. Can you please call the lab to make an appointment. The order has been placed.  If this continues to be elevated we will discuss option for further evaluation. I hope this message finds you well.  Please let us know if you have any questions. RAYSA

## 2023-03-15 ENCOUNTER — OFFICE VISIT (OUTPATIENT)
Dept: PODIATRY | Facility: CLINIC | Age: 43
End: 2023-03-15
Attending: PHYSICIAN ASSISTANT
Payer: OTHER GOVERNMENT

## 2023-03-15 ENCOUNTER — ANCILLARY PROCEDURE (OUTPATIENT)
Dept: GENERAL RADIOLOGY | Facility: CLINIC | Age: 43
End: 2023-03-15
Attending: PODIATRIST
Payer: OTHER GOVERNMENT

## 2023-03-15 VITALS — HEIGHT: 67 IN | WEIGHT: 198 LBS | HEART RATE: 59 BPM | BODY MASS INDEX: 31.08 KG/M2 | OXYGEN SATURATION: 97 %

## 2023-03-15 DIAGNOSIS — M20.10 HALLUX VALGUS, UNSPECIFIED LATERALITY: Primary | ICD-10-CM

## 2023-03-15 DIAGNOSIS — M20.10 HALLUX VALGUS, UNSPECIFIED LATERALITY: ICD-10-CM

## 2023-03-15 PROCEDURE — 73630 X-RAY EXAM OF FOOT: CPT | Mod: LT | Performed by: PODIATRIST

## 2023-03-15 PROCEDURE — 99214 OFFICE O/P EST MOD 30 MIN: CPT | Performed by: PODIATRIST

## 2023-03-15 ASSESSMENT — PAIN SCALES - GENERAL: PAINLEVEL: SEVERE PAIN (6)

## 2023-03-15 NOTE — PATIENT INSTRUCTIONS
Tu,      Your surgery with [facility] will be scheduled. Please read thoroughly and follow instructions.     Procedure:   bunionectomy with first metatarsal osteotomy with internal screw fixation right foot and a modified Parrish bunionectomy of the left foot    Procedure Date :     *A surgery nurse will call you 1-2 days before surgery to inform you of the time of arrival for surgery.    Surgeon:   MD Romel Mosquera    Admission Type:   Outpatient    Procedure Location:   [facility]:  [address] [place] [address] [address] [place] [address] (Fax: [phone])    Home Rapid COVID Test: To be done 1-2 days before surgery if done at [facility]. See below.     Post Operative Appointment:       Preparation Instructions to complete:    []  You will need a Pre-op Physical within 30 days before surgery with your primary care provider. This exam is required by the anesthesiologist to ensure a safe surgical experience.    Failure  to obtain your pre-op physical will lead to cancellation of your surgery  Call them right away to schedule this. Please ensure your Preoperative Physical is faxed to the Hospital (numbers listed above)    [] Preoperative Medication Instructions  We would like you to stop your anticoagulation medications 3-5 days before surgery HOWEVER contact your prescribing provider for instructions before discontinuing any medications. (Examples: Coumadin, Plavix, Xarelto, Eliquis, Pradaxa, Effient, Brilinta) If you are on Coumadin, we would like the goal INR ? 1.2.  IT IS OK TO STAY ON ASPIRIN PRIOR TO SURGERY.   Hold Ibuprofen, Herbal Supplements and Vitamin E 7 days before  Stop Cialis, Levitra and Viagra 2-3 days before surgery    [] Fasting Requirements  Nothing to eat for 8 hours before surgery unless instructed differently by the surgery nurse.  You may have clear liquids up to two hours before your arrival time (coffee, tea, water, or Gatorade. No  cream or milk)  If your primary care provider has instructed you to continue oral medications, you may take them on the morning of surgery with a small sip of water.    No alcohol or smoking after 12:00am the day of surgery    [] Home Rapid COVID-19 test is required only at [facility]  You will need a home rapid test done 1-2 days before surgery. Please take a photo of result on your phone and show to staff.   If you are experiencing COVID symptoms or have tested positive for COVID-19 within 14 days of procedure date, reach out to the care team to reschedule please.   If your surgery is located at the hospital, you will not need a COVID test.     [] Contact your insurance regarding coverage  If you would like a Good Carolyn Estimate for your upcoming procedure at [facility] Location, contact Cost of Care Estimates   Advocates are available Monday through Friday 8am - 5pm   [phone]  You may also submit a request online through [employer]    For all self-pay, estimate, or anesthesia billing questions at [facility], the contact information is below:    Who to contact: Jessenia RAM  [facility] number: [phone]  Prepay number: [phone]    [] DO NOT BRING FMLA WITH TO SURGERY.  These should be sent to the provider's office by fax to [phone].    [] Day of Surgery  Medications - Take as indicated with sips of water.   Wear comfortable loose fitting clothes. Wear your glasses-Not contacts. Do not wear jewelry and remove body piercing's. Surgery may be cancelled if they are not removed.   You may have 1 family member wait in the lobby during your surgery. Visitor restrictions are subject to change. Please verify with the surgery nurse when they call.   If same day surgery-Have a someone come with you to surgery that can help you understand the surgeon's instructions, drive you home and stay with you overnight the first night.    [] If the community sees a new  COVID-19 surge, your procedure may need to be postponed. We will contact you if this happens.    [] You will receive a call from a surgery nurse 1-3 days prior to surgery. They will go over more details with you.       Frequently Asked Questions    WHAT DO I DO WHEN I COME HOME FROM SURGERY?    After surgery and/ or your hospital stay you may be tired and drowsy. Rest, but make sure to get up and walk around every couple of hours to circulate your blood. If you are non-weight bearing do not put any weight on your foot.  Be sure to take your medications as directed. Try to get a restful sleep and begin more normal activities as tolerated.    HOW MUCH PAIN SHOULD I EXPECT AND WILL I HAVE PAIN MEDICATION?    Everyone tolerates pain differently. Still, each type of surgery involves a certain level and type of pain. Generally most people feel better within a few days but keep in mind that everyone has a different tolerance to pain. All medications should be taken as directed. All medications can have side effects such as bleeding, upset stomach, headaches, dizziness, constipation, etc. If you have any major problems or allergic reaction, stop the medication and call the office. If you only have a little pain, you may simply take Tylenol or Ibuprofen as directed on the label.     WHAT ABOUT MY DRESSING AND WHEN DO I COME BACK TO THE OFFICE?    The outer dressing stays in place for 7 days and when you come in for your first post-op we will remove it.  Some patients may have staples, zipper or sutures; these stay in for 10-14 days and will be removed at your 2nd post-op visit. After 24 hours you may shower using shower precautions.    WHAT ABOUT SWELLING, REDNESS, BRUISING OR DRAINAGE?    It is normal to have some swelling, and bruising after surgery. It gradually subsides but may be present for several weeks. Elevation and icing will help to reduce the swelling more rapidly.  If your bandage is really tight after 24 hours  you may cut the bandage an inch by the toes or under your foot and by your ankle.  Ice therapy often works better than oral pain medication. Using cold therapy is recommended every hour for 20 minutes.    WHEN CAN I TAKE A BATH?    You can take a bath as long as the wound has no drainage and is fully healed without a scab. This generally takes about 2 weeks.  Unless you get the bandaged protector from above then you can take a shower when you feel up to it.    WHEN CAN I RETURN TO WORK?    You may return to work to an office-type job whenever you feel comfortable enough. The only restriction would be your own motivation and pain tolerance. If your job requires vigorous activities you may be off 1-4 weeks which is determined by what surgery you have and your operating physician.     WHAT ABOUT DRIVING OR FLYING?    As a general rule, you may resume driving as soon as you are comfortable and fully alert and off narcotic pain medications. If you are traveling by plane within 4 weeks of surgery, perform range of motion exercises of the legs and feet during the flight. Get up and walk around frequently to prevent blood clots.      WHEN CAN I EXERSICE?    You may return to normal activities such as exercising when your surgeon tells you usually 2 weeks. Walking, sitting, standing and going up the stairs are all fine after the 2-week jozef. You may have restrictions for 1-4 weeks of no lifting, pushing, pulling in excess of 20 lbs. This is determined by what surgery you have and your surgeon.    WILL I BECOME ADDICTED TO MY PAIN MEDICATION?    Pain medications are safe and effective when used as directed. However, misuse of these products can be extremely harmful and even deadly. Pain medications must be taken only as directed by your surgeon. Do not change the dose of your pain medication without talking to your operating physician first.    POSTOPERATIVE INFORMATION: MANAGING PAIN  Measuring Your Pain    A pain scale  helps you rate pain intensity. In the scale, 0 means no pain, and 10 is the worst pain possible.  You should rate your pain every 4-6 hours. You may feel some pain even with medications. It is important to tell your health care provider if medications don't reduce the pain.        Managing Post-Op Pain at Home: Medications    Pain after an operation (post-op pain) is common and expected. These guidelines can help you stay as comfortable as possible.    Taking Pain Medications    Take any prescribed pain medications as directed by your doctor.  Take pain medications with some food to avoid an upset stomach.  Be aware that narcotic pain medications cause constipation. We recommend taking Milk of Magnesia   Eating high-fiber foods and drink plenty of water.  Don t drink alcohol while using pain medications.  Possible side effects include stomach upset, nausea, and itching.    Alternating Ibuprofen with your pain medication    Ibuprofen has three actions: it reduces fever, relieves pain and fights inflammation. Alternate between your prescribed pain medication and Ibuprofen every three hours (i.e., take a dose of Ibuprofen then three hours later take your prescribed pain medication then three hours later Ibuprofen, ect.) These two medications are safe to use together like this.    POSTOPERATIVE INFORMATION: CONSTIPATION    Constipation after surgery is a common problem and is often related to taking post-operative narcotic pain medication. Signs that you may be constipated include bloating, abdominal distention and/or pain.    Constipation Prevention & Treatment    Drink plenty of fluids! This is the most important thing you can do to help prevent constipation.  Increase physical activity as recommended by your surgeon.  Consume a high fiber diet. We recommend introducing high fiber foods pre-operatively. Some foods high in fiber include:    Oatmeal Bran  Flaxseed Dried Fruit    Carrots    Bananas Strawberries Oranges Whole Grain Bread     Bananas Prunes  Pears  Raspberries     Over the counter medication/supplements - in order of recommended treatment:   (Be sure to follow instructions on product packaging)    Fiber supplement   Bulk forming laxative - Can be used to prevent constipation  Great food sources of fiber include but are not limited to:  Colace (docusate sodium)  Osmotic stool softener - Can be used 2-3 times per day to prevent constipation  Milk of Magnesia  MIRALAX  Enema/Suppository    Patient can also  some probiotics such as Culturelle to help prevent Antibiotic associated diarrhea. They can take this 1 hour before or 2 hours after taking their antibiotic should not be taken at the same time as they can cancel out the effects.                          ** AFTER SURGERY INSTRUCTIONS **                          [] You are to remain weight bearing on your right foot for 1 month with a post op shoe and two weeks on your left foot with a post op shoe.    [] Prior to surgery you will be given a POST OP SHOE which you will need to WEAR THIS ANYTIME YOU ARE UP AND OUT OF BED, IT IS OKAY TO REMOVE WHEN YOU ARE SLEEPING. Please bring this with you on the day of surgery.      [] During surgery   will apply a gauze dressing to your foot. This will remain intact until you are seen in clinic for follow up    [] It is NOT OKAY to get your surgical site wet while bathing, you will need to purchase a cast cover to protect your foot from getting wet. You can purchase this at [facility] or your local pharmacy.    [] It is important that you elevate your affected foot after surgery. Proper elevation is raising your foot above your waist. The fluid in your lower extremities needs to get back to your heart so it can get pumped to your kidneys and expelled through urination. So if you notice you have to go to the bathroom more frequently when you are elevating your leg this is a  good sign that it is working.     [] It is important that you increase your protein intake after surgery. Protein is essential for wound healing. We recommend you take a protein supplement twice per day. This is in addition to your normal diet. Examples of protein supplements are Ensure, Boost, Glucerna (if you are diabetic), or protein powder. You can purchase these at your local retailer or grocery store.       Notify our office right away, if you have any changes in your health status, or if you develop a cold, flu, diarrhea, infection, fever or sore throat before your scheduled surgery date. We can be reached at [phone]   Monday-Friday 8 am - 4:30 pm if you have any questions.     Thank you for trusting us with your care!      For informational purposes only. Not to replace the advice of your health care provider. Copyright   2020 [employer]. All rights reserved. Clinically reviewed by Infection Prevention and the [facility] COVID-19 Clinical Team. [employer] 580619 - Rev 09/09/21.

## 2023-03-15 NOTE — PROGRESS NOTES
FOOT AND ANKLE SURGERY/PODIATRY Progress Note        ASSESSMENT:   Hallux abductovalgus both feet    TREATMENT:  An x-ray of the left  foot was taken today.  The x-rays were read and interpreted by this physician today.  I informed the patient he will require surgical correction of his painful bunion deformities of both feet.  I recommended a bunionectomy with first metatarsal osteotomy with internal screw fixation of both feet.  The patient was told the procedures are performed on an outpatient basis under local anesthesia with IV sedation.  He was told the procedures will take approximately 25 minutes per foot to perform. He would then be discharged weightbearing in a postop shoe for 1 month.  He was asked to go n.p.o. at midnight prior to the procedure and he was asked to see his primary care physician for preoperative consultation.  All pertinent questions were invited and answered.    HPI:Tu Mora return to the clinic today  complaining of bunions both feet.  The patient was previously diagnosed with a bunion deformity of the right foot.  At that time he was recommended that he undergo surgical correction of the bunion on the right foot.  The patient stated that he now has pain involving the bunion of the left foot.  The pain is moderate to severe.  It is an aching type pain with weightbearing and ambulation.  He does have some relief with nonweightbearing.  He denies any trauma to his feet.  He has not had any associated redness or swelling.  He denies any other previous treatment.  He stated that he is prepared to move forward with the surgical option.    Past Medical History:   Diagnosis Date     GERD (gastroesophageal reflux disease) 10/3/2013        Past Surgical History:   Procedure Laterality Date     SEPTOPLASTY       WISDOM TOOTH EXTRACTION         Allergies   Allergen Reactions     Cat Dander [Animal Dander] Unknown     Pollen Extract Other (See Comments)         Current Outpatient  "Medications:      fluticasone (FLONASE) 50 MCG/ACT nasal spray, Spray 1 spray into both nostrils 2 times daily, Disp: 16 g, Rfl: 0     ibuprofen 200 MG capsule, Take 200 mg by mouth every 4 hours as needed for fever, Disp: 120 capsule, Rfl:     Family History   Problem Relation Age of Onset     Sleep Apnea Mother      Hypertension Father        Social History     Socioeconomic History     Marital status: Single     Spouse name: Not on file     Number of children: 1     Years of education: Not on file     Highest education level: Not on file   Occupational History     Not on file   Tobacco Use     Smoking status: Never     Smokeless tobacco: Never   Substance and Sexual Activity     Alcohol use: Yes     Comment: 5 drinks weekly     Drug use: No     Sexual activity: Yes     Partners: Female   Other Topics Concern     Parent/sibling w/ CABG, MI or angioplasty before 65F 55M? No   Social History Narrative    Lives with his wife and 3 children ages 10, 7 and 4.       Social Determinants of Health     Financial Resource Strain: Not on file   Food Insecurity: Not on file   Transportation Needs: Not on file   Physical Activity: Not on file   Stress: Not on file   Social Connections: Not on file   Intimate Partner Violence: Not on file   Housing Stability: Not on file       10 point Review of Systems is negative except as mentioned above.       Pulse 59   Ht 1.689 m (5' 6.5\")   Wt 89.8 kg (198 lb)   SpO2 97%   BMI 31.48 kg/m      BMI= Body mass index is 31.48 kg/m .    OBJECTIVE:  General appearance: Patient is alert and fully cooperative with history & exam.  No sign of distress is noted during the visit.  Vascular: Dorsalis pedis and posterior tibial pulses are palpable. There is pedal hair growth bilaterally.  CFT < 3 sec from anterior tibial surface to distal digits bilaterally. There is no appreciable edema noted.  Dermatologic: Turgor and texture are within normal limits. No coloration or temperature changes. No " primary or secondary lesions noted.  Neurologic: All epicritic and proprioceptive sensations are grossly intact bilaterally.  Musculoskeletal: All active and passive ankle, subtalar, midtarsal, and 1st MPJ range of motion are grossly intact without pain or crepitus, with the exception of none. Manual muscle strength is within normal limits bilaterally. All dorsiflexors, plantarflexors, invertors, evertors are intact bilaterally. Tenderness present to the first metatarsal phalangeal joint both feet on palpation. Tenderness to the first metatarsal phalangeal joint both feet with range of motion.  There is a large firm palpable subcutaneous mass on the medial aspect of the head of the first metatarsal both feet. There is mild lateral deviation of the hallux which buttresses the second toe right foot.  No crepitus on range of motion of the first MPJ right foot.  Calf is soft/non-tender without warmth/induration    Imaging:         No results found.     Romel Mosquera; BOBBY  [facility] Foot & Ankle Surgery/Podiatry

## 2023-03-15 NOTE — LETTER
3/15/2023         RE: Tu Mora  [address] [place] [address]        Dear Colleague,    Thank you for referring your patient, Tu Mora, to the [facility]. Please see a copy of my visit note below.    FOOT AND ANKLE SURGERY/PODIATRY Progress Note        ASSESSMENT:   Hallux abductovalgus both feet    TREATMENT:  An x-ray of the left  foot was taken today.  The x-rays were read and interpreted by this physician today.  I informed the patient he will require surgical correction of his painful bunion deformities of both feet.  I recommended a bunionectomy with first metatarsal osteotomy with internal screw fixation of both feet.  The patient was told the procedures are performed on an outpatient basis under local anesthesia with IV sedation.  He was told the procedures will take approximately 25 minutes per foot to perform. He would then be discharged weightbearing in a postop shoe for 1 month.  He was asked to go n.p.o. at midnight prior to the procedure and he was asked to see his primary care physician for preoperative consultation.  All pertinent questions were invited and answered.    HPI:Tu Mora return to the clinic today  complaining of bunions both feet.  The patient was previously diagnosed with a bunion deformity of the right foot.  At that time he was recommended that he undergo surgical correction of the bunion on the right foot.  The patient stated that he now has pain involving the bunion of the left foot.  The pain is moderate to severe.  It is an aching type pain with weightbearing and ambulation.  He does have some relief with nonweightbearing.  He denies any trauma to his feet.  He has not had any associated redness or swelling.  He denies any other previous treatment.  He stated that he is prepared to move forward with the surgical option.    Past Medical History:   Diagnosis Date     GERD (gastroesophageal reflux disease)  "10/3/2013        Past Surgical History:   Procedure Laterality Date     SEPTOPLASTY       WISDOM TOOTH EXTRACTION         Allergies   Allergen Reactions     Cat Dander [Animal Dander] Unknown     Pollen Extract Other (See Comments)         Current Outpatient Medications:      fluticasone (FLONASE) 50 MCG/ACT nasal spray, Spray 1 spray into both nostrils 2 times daily, Disp: 16 g, Rfl: 0     ibuprofen 200 MG capsule, Take 200 mg by mouth every 4 hours as needed for fever, Disp: 120 capsule, Rfl:     Family History   Problem Relation Age of Onset     Sleep Apnea Mother      Hypertension Father        Social History     Socioeconomic History     Marital status: Single     Spouse name: Not on file     Number of children: 1     Years of education: Not on file     Highest education level: Not on file   Occupational History     Not on file   Tobacco Use     Smoking status: Never     Smokeless tobacco: Never   Substance and Sexual Activity     Alcohol use: Yes     Comment: 5 drinks weekly     Drug use: No     Sexual activity: Yes     Partners: Female   Other Topics Concern     Parent/sibling w/ CABG, MI or angioplasty before 65F 55M? No   Social History Narrative    Lives with his wife and 3 children ages 10, 7 and 4.       Social Determinants of Health     Financial Resource Strain: Not on file   Food Insecurity: Not on file   Transportation Needs: Not on file   Physical Activity: Not on file   Stress: Not on file   Social Connections: Not on file   Intimate Partner Violence: Not on file   Housing Stability: Not on file       10 point Review of Systems is negative except as mentioned above.       Pulse 59   Ht 1.689 m (5' 6.5\")   Wt 89.8 kg (198 lb)   SpO2 97%   BMI 31.48 kg/m      BMI= Body mass index is 31.48 kg/m .    OBJECTIVE:  General appearance: Patient is alert and fully cooperative with history & exam.  No sign of distress is noted during the visit.  Vascular: Dorsalis pedis and posterior tibial pulses are " palpable. There is pedal hair growth bilaterally.  CFT < 3 sec from anterior tibial surface to distal digits bilaterally. There is no appreciable edema noted.  Dermatologic: Turgor and texture are within normal limits. No coloration or temperature changes. No primary or secondary lesions noted.  Neurologic: All epicritic and proprioceptive sensations are grossly intact bilaterally.  Musculoskeletal: All active and passive ankle, subtalar, midtarsal, and 1st MPJ range of motion are grossly intact without pain or crepitus, with the exception of none. Manual muscle strength is within normal limits bilaterally. All dorsiflexors, plantarflexors, invertors, evertors are intact bilaterally. Tenderness present to the first metatarsal phalangeal joint both feet on palpation. Tenderness to the first metatarsal phalangeal joint both feet with range of motion.  There is a large firm palpable subcutaneous mass on the medial aspect of the head of the first metatarsal both feet. There is mild lateral deviation of the hallux which buttresses the second toe right foot.  No crepitus on range of motion of the first MPJ right foot.  Calf is soft/non-tender without warmth/induration    Imaging:         No results found.     Romel Lopez DPM  [facility] Foot & Ankle Surgery/Podiatry         Again, thank you for allowing me to participate in the care of your patient.        Sincerely,        Romel Mosquera DPM

## 2023-03-17 ENCOUNTER — TELEPHONE (OUTPATIENT)
Dept: PODIATRY | Facility: CLINIC | Age: 43
End: 2023-03-17
Payer: OTHER GOVERNMENT

## 2023-03-20 ENCOUNTER — TELEPHONE (OUTPATIENT)
Dept: PODIATRY | Facility: CLINIC | Age: 43
End: 2023-03-20
Payer: OTHER GOVERNMENT

## 2023-03-20 NOTE — TELEPHONE ENCOUNTER
I contacted patient to schedule surgery with Dr. Mosquera. He will want to schedule in September. Call back in May to confirm an exact date.

## 2023-04-04 ENCOUNTER — DOCUMENTATION ONLY (OUTPATIENT)
Dept: LAB | Facility: CLINIC | Age: 43
End: 2023-04-04
Payer: OTHER GOVERNMENT

## 2023-04-04 DIAGNOSIS — K52.9 CHRONIC DIARRHEA OF UNKNOWN ORIGIN: Primary | ICD-10-CM

## 2023-04-07 NOTE — PROGRESS NOTES
Hepatic Panel ordered for 4/18    Called Tu to notify lab orders have been placed,   Tu reports understanding and appreciation.

## 2023-04-18 ENCOUNTER — LAB (OUTPATIENT)
Dept: LAB | Facility: CLINIC | Age: 43
End: 2023-04-18
Payer: OTHER GOVERNMENT

## 2023-04-18 DIAGNOSIS — K52.9 CHRONIC DIARRHEA OF UNKNOWN ORIGIN: ICD-10-CM

## 2023-04-18 LAB
ALBUMIN SERPL BCG-MCNC: 4.7 G/DL (ref 3.5–5.2)
ALP SERPL-CCNC: 85 U/L (ref 40–129)
ALT SERPL W P-5'-P-CCNC: 73 U/L (ref 10–50)
AST SERPL W P-5'-P-CCNC: 37 U/L (ref 10–50)
BILIRUB DIRECT SERPL-MCNC: <0.2 MG/DL (ref 0–0.3)
BILIRUB SERPL-MCNC: 0.6 MG/DL
PROT SERPL-MCNC: 7.7 G/DL (ref 6.4–8.3)

## 2023-04-18 PROCEDURE — 80076 HEPATIC FUNCTION PANEL: CPT

## 2023-04-18 PROCEDURE — 36415 COLL VENOUS BLD VENIPUNCTURE: CPT

## 2023-04-20 ENCOUNTER — TELEPHONE (OUTPATIENT)
Dept: GASTROENTEROLOGY | Facility: CLINIC | Age: 43
End: 2023-04-20
Payer: OTHER GOVERNMENT

## 2023-04-20 NOTE — TELEPHONE ENCOUNTER
LVM with spouse (pt's vm box was full)    Appt with Dr. García on 8/30 needed to be moved up 20 mins due to a template change. Left call center # if pt has any questions or concerns.

## 2023-05-23 ENCOUNTER — TELEPHONE (OUTPATIENT)
Dept: GASTROENTEROLOGY | Facility: CLINIC | Age: 43
End: 2023-05-23
Payer: OTHER GOVERNMENT

## 2023-05-23 DIAGNOSIS — K52.9 CHRONIC DIARRHEA OF UNKNOWN ORIGIN: Primary | ICD-10-CM

## 2023-05-23 NOTE — TELEPHONE ENCOUNTER
Screening Questions  BLUE  KIND OF PREP RED  LOCATION [review exclusion criteria] GREEN  SEDATION TYPE        Y Are you active on mychart?       WISE Ordering/Referring Provider?         What type of coverage do you have?      N Have you had a positive covid test in the last 14 days?     31.48  1. BMI  [BMI 40+ - review exclusion criteria& smart-phrase document]    Y  2. Are you able to give consent for your medical care? [IF NO,RN REVIEW]          N  3. Are you taking any prescription pain medications on a routine schedule   (ex narcotics: oxycodone, roxicodone, oxycontin,  and percocet)? [RN Review]          3a. EXTENDED PREP What kind of prescription?     N 4. Do you have any chemical dependencies such as alcohol, street drugs, or methadone?        **If yes 3- 5 , please schedule with MAC sedation.**          IF YES TO ANY 6 - 10 - HOSPITAL SETTING ONLY.     N 6.   Do you need assistance transferring?     N 7.   Have you had a heart or lung transplant?    N 8.   Are you currently on dialysis?   N 9.   Do you use daily home oxygen?   N 10. Do you take nitroglycerin?   10a.  If yes, how often?     NA 11. Are you currently pregnant?    11a.  If yes, how many weeks? [ Greater than 12 weeks, OR NEEDED]    N 12. Do you have Pulmonary Hypertension? *NEED PAC APPT AT [facility] w/ [facility]*     N 13. [review exclusion criteria]  Do you have any implantable devices in your body (pacemaker, defib, LVAD)?    N 14. In the past 6 months, have you had any heart related issues including cardiomyopathy or heart attack?     14a.  If yes, did it require cardiac stenting if so when?     N 15. Have you had a stroke or Transient ischemic attack (TIA - aka  mini stroke ) within 6 months?      N 16. Do you have mod to severe Obstructive Sleep Apnea?  [Hospital only]    N 17. Do you have SEVERE AND UNCONTROLLED asthma? *NEED PAC APPT AT [facility] w/[facility]*     18.Do you take blood thinners?  No    N 19. Do you take any of the following  "medications?    NPhentermine    NOzempic    NWegovy (Semaglutide)      19a. If yes, \"Hold for 7 days before procedure.  Please consult your prescribing provider if you have questions about holding this medication.\"     N  20. Do you have chronic kidney disease?      N  21. Do you have a diagnosis of diabetes?     N  22. On a regular basis do you go 3-5 days between bowel movements?      23. Preferred LOCAL Pharmacy for Pre Prescription      [id number] [address]        - CLOSING REMINDERS -    You will receive a call from a Nurse to review instructions and health history.  This assessment must be completed prior to your procedure.  Failure to complete the Nurse assessment may result in the procedure being cancelled.      On the day of your procedure, please designatean adult(s) who can drive you home stay with you for the next 24 hours. The medicines used in the exam will make you sleepy. You will not be able to drive.      You cannot take public transportation, ride share services, or non-medical taxi service without a responsible caregiver.  Medical transport services are allowed with the requirement that a responsible caregiver will receive you at your destination.  We require that drivers and caregivers are confirmed prior to your procedure.      - SCHEDULING DETAILS -  N &  Hospital Setting Required & If yes, what is the exclusion?   N WISE  Surgeon    08/31/2023  Date of Procedure  Lower Endoscopy [Colonoscopy]  Type of Procedure Scheduled  [facility] Location   MIRALAX GATORADE WITHOUT MAGNEISUM CITRATE Which Colonoscopy Prep was Sent?     MAC Sedation Type     N PAC / Pre-op Required               "

## 2023-05-23 NOTE — TELEPHONE ENCOUNTER
----- Message from Rosalind García MD sent at 5/22/2023  4:04 PM CDT -----  Regarding: liver tests  Hi, can you call this patient and let him know the liver number was a bit improved on the second check, which is reassuring. He should continue to follow lifestyle modifications, try to lose weight goal 1 lb/week and avoid alcohol and we will recheck in 2-3 months and if not further improved consider further evaluation at that time.  Also can you place an order for hepatic panel in 2.5 months, thank you.

## 2023-05-23 NOTE — TELEPHONE ENCOUNTER
Spoke with Tu,    Reviewed results and recommendations per Dr. García.     Hepatic recheck order placed. To be checked in 2-3 months Tu reports understanding.     Tu asks about colonoscopy appointment, writer does not see it scheduled, spoke with endoscopy who will reach out to Tu to schedule.

## 2023-05-31 ENCOUNTER — TELEPHONE (OUTPATIENT)
Dept: PODIATRY | Facility: CLINIC | Age: 43
End: 2023-05-31
Payer: OTHER GOVERNMENT

## 2023-05-31 PROBLEM — M20.10 HALLUX VALGUS, UNSPECIFIED LATERALITY: Status: ACTIVE | Noted: 2023-05-31

## 2023-05-31 NOTE — TELEPHONE ENCOUNTER
He may want to schedule surgery with Dr. Mosquera on 09/11/2023. He will not be able to drive for 1 month so he needs to confirm he can make arrangements for this.    Check back with patient in August to confirm.

## 2023-07-19 ENCOUNTER — TELEPHONE (OUTPATIENT)
Dept: GASTROENTEROLOGY | Facility: CLINIC | Age: 43
End: 2023-07-19
Payer: OTHER GOVERNMENT

## 2023-07-19 NOTE — TELEPHONE ENCOUNTER
Caller: Audrey  Reason for Reschedule/Cancellation (please be detailed, any staff messages or encounters to note?): Md out of office      Prior to reschedule please review:    Ordering Provider: AMADOR García    Sedation per order: mac    Does patient have any ASC Exclusions, please identify?: N      Notes on Cancelled Procedure:    Procedure: Lower Endoscopy [Colonoscopy]     Date: 8/31    Location: [facility]; [address] [place] [address], [address] Floor, [place] [address]    Surgeon: AMADOR García      Rescheduled: No    Unable to leave message vm not set up yet/letter sent/case in depot

## 2023-07-20 NOTE — TELEPHONE ENCOUNTER
Talked with patient and he is rescheduled to 10/26/23 with Dr.N García. He was not happy that this was his 2nd reschedule with her and didn't talk much while we were on the phone. (looking at notes this is the 1st reschedule for the colonoscopy, looks like maybe the clinic appointment had been rescheduled.  I did sent  a message asking her if he could maybe see another provider to do his procedure so he can get in sooner. Sent new info in the mail to him as he does not have a mychart.

## 2023-08-10 NOTE — TELEPHONE ENCOUNTER
Confirmed with patient.  Surgery scheduled with Dr. Mosquera at [facility] on 09/11/2023.    Bunionectomy with first metatarsal osteotomy with internal screw fixation both feet.  CPT Code 71164     Added to [place].  Email sent to Katie.    Pre-op physical 08/28/2023.  Post op scheduled.  Surgery Instructions mailed to patient.

## 2023-08-15 NOTE — TELEPHONE ENCOUNTER
Surgery scheduled 09/11/2023.        Prior Auth Status:                 Approved Outpatient - Received Payer Authorization  Prior Auth Contact/Ref #:       Prior Auth Phone:                     Prior Auth CPTs:                     99828  Prior Auth Number:                 [id number]  Prior Auth Date Range:          3/17/2023 - 9/13/2023

## 2023-08-16 PROBLEM — K64.9 HEMORRHOIDS: Status: ACTIVE | Noted: 2023-08-16

## 2023-08-16 PROBLEM — M72.2 PLANTAR FASCIITIS: Status: ACTIVE | Noted: 2023-08-16

## 2023-08-16 PROBLEM — M25.569 ARTHRALGIA OF KNEE: Status: ACTIVE | Noted: 2023-08-16

## 2023-08-16 PROBLEM — F43.10 POST-TRAUMATIC STRESS DISORDER: Status: ACTIVE | Noted: 2023-08-16

## 2023-08-16 PROBLEM — J06.9 ACUTE UPPER RESPIRATORY INFECTION: Status: ACTIVE | Noted: 2023-08-16

## 2023-08-16 PROBLEM — M92.529 OSGOOD-SCHLATTER'S DISEASE: Status: ACTIVE | Noted: 2023-08-16

## 2023-08-16 PROBLEM — M79.673 PAIN OF FOOT: Status: ACTIVE | Noted: 2023-08-16

## 2023-08-16 PROBLEM — K64.5 THROMBOSED EXTERNAL HEMORRHOIDS: Status: ACTIVE | Noted: 2023-08-16

## 2023-08-16 PROBLEM — Z91.199 NONCOMPLIANCE WITH TREATMENT: Status: ACTIVE | Noted: 2023-08-16

## 2023-08-16 PROBLEM — F32.A DEPRESSION: Status: ACTIVE | Noted: 2023-08-16

## 2023-08-16 PROBLEM — E78.00 PURE HYPERCHOLESTEROLEMIA: Status: ACTIVE | Noted: 2023-08-16

## 2023-08-16 PROBLEM — Z01.89 PHYSICAL THERAPY EVALUATION, INITIAL: Status: ACTIVE | Noted: 2023-08-16

## 2023-08-16 PROBLEM — M21.40 ACQUIRED PES PLANUS: Status: ACTIVE | Noted: 2023-08-16

## 2023-08-16 PROBLEM — M25.50 ARTHRALGIA OF MULTIPLE JOINTS: Status: ACTIVE | Noted: 2023-08-16

## 2023-08-16 PROBLEM — N50.819 TESTICULAR PAIN: Status: ACTIVE | Noted: 2023-08-16

## 2023-08-16 PROBLEM — M67.50 PLICA SYNDROME: Status: ACTIVE | Noted: 2023-08-16

## 2023-08-28 ENCOUNTER — OFFICE VISIT (OUTPATIENT)
Dept: FAMILY MEDICINE | Facility: CLINIC | Age: 43
End: 2023-08-28
Payer: OTHER GOVERNMENT

## 2023-08-28 VITALS
SYSTOLIC BLOOD PRESSURE: 117 MMHG | HEIGHT: 67 IN | OXYGEN SATURATION: 95 % | HEART RATE: 64 BPM | WEIGHT: 193.3 LBS | BODY MASS INDEX: 30.34 KG/M2 | TEMPERATURE: 98 F | DIASTOLIC BLOOD PRESSURE: 84 MMHG

## 2023-08-28 DIAGNOSIS — Z01.818 PREOPERATIVE EXAMINATION: Primary | ICD-10-CM

## 2023-08-28 DIAGNOSIS — M20.11 VALGUS DEFORMITY OF BOTH GREAT TOES: ICD-10-CM

## 2023-08-28 DIAGNOSIS — M20.12 VALGUS DEFORMITY OF BOTH GREAT TOES: ICD-10-CM

## 2023-08-28 PROBLEM — J06.9 UPPER RESPIRATORY INFECTION: Status: ACTIVE | Noted: 2023-08-28

## 2023-08-28 PROBLEM — J06.9 UPPER RESPIRATORY INFECTION: Status: RESOLVED | Noted: 2023-08-28 | Resolved: 2023-08-28

## 2023-08-28 PROBLEM — J06.9 ACUTE UPPER RESPIRATORY INFECTION: Status: RESOLVED | Noted: 2023-08-16 | Resolved: 2023-08-28

## 2023-08-28 PROCEDURE — 99213 OFFICE O/P EST LOW 20 MIN: CPT | Performed by: FAMILY MEDICINE

## 2023-08-28 ASSESSMENT — PAIN SCALES - GENERAL: PAINLEVEL: NO PAIN (0)

## 2023-08-28 NOTE — PROGRESS NOTES
[facility]  [address] [address]  Phone: [phone]  Fax: [phone]  Primary Provider: Naida Ortzi  Pre-op Performing Provider: RACHEL PALMA      PREOPERATIVE EVALUATION:  Today's date: 8/28/2023    Tu Mora is a 43 year old male who presents for a preoperative evaluation.      8/28/2023     8:59 AM   Additional Questions   Roomed by [facility], Visit Facilitator       Surgical Information:  Surgery/Procedure: Bunionectomy  Surgery Location: [facility]  Surgeon: Dr. Mosquera  Surgery Date: 09/11/2023  Time of Surgery: unknown  Where patient plans to recover: At home with family  Fax number for surgical facility: Note does not need to be faxed, will be available electronically in Epic.    Assessment & Plan     The proposed surgical procedure is considered LOW risk.    Preoperative examination  No concerns.  Patient may proceed with surgery without further testing. Advised patient to switch from occasional ibuprofen to Tylenol 1 week before surgery.    Valgus deformity of both great toes  Patient is low risk for complications from surgery.  Patient may proceed with surgery without further testing. Advised patient to switch from occasional ibuprofen to Tylenol 1 week before surgery.            - No identified additional risk factors other than previously addressed    Antiplatelet or Anticoagulation Medication Instructions:   - Patient is on no antiplatelet or anticoagulation medications.    Additional Medication Instructions:  Patient is on no additional chronic medications    RECOMMENDATION:  APPROVAL GIVEN to proceed with proposed procedure, without further diagnostic evaluation.      20 minutes spent by me on the date of the encounter doing chart review, history and exam, documentation and further activities per the note      Subjective       HPI related to upcoming procedure: Pain with walking of bilateral feet, has bilateral hallux  michellelesly        8/28/2023     8:54 AM   Preop Questions   1. Have you ever had a heart attack or stroke? No   2. Have you ever had surgery on your heart or blood vessels, such as a stent placement, a coronary artery bypass, or surgery on an artery in your head, neck, heart, or legs? No   3. Do you have chest pain with activity? No   4. Do you have a history of  heart failure? No   5. Do you currently have a cold, bronchitis or symptoms of other infection? No   6. Do you have a cough, shortness of breath, or wheezing? No   7. Do you or anyone in your family have previous history of blood clots? No   8. Do you or does anyone in your family have a serious bleeding problem such as prolonged bleeding following surgeries or cuts? No   9. Have you ever had problems with anemia or been told to take iron pills? No   10. Have you had any abnormal blood loss such as black, tarry or bloody stools? No   11. Have you ever had a blood transfusion? No   12. Are you willing to have a blood transfusion if it is medically needed before, during, or after your surgery? Yes   13. Have you or any of your relatives ever had problems with anesthesia? No   14. Do you have sleep apnea, excessive snoring or daytime drowsiness? No   15. Do you have any artifical heart valves or other implanted medical devices like a pacemaker, defibrillator, or continuous glucose monitor? No   16. Do you have artificial joints? No   17. Are you allergic to latex? No       Health Care Directive:  Patient does not have a Health Care Directive or Living Will: Discussed advance care planning with patient; information given to patient to review.    Preoperative Review of :   reviewed - no record of controlled substances prescribed.          Review of Systems  CONSTITUTIONAL: NEGATIVE for fever, chills, change in weight  INTEGUMENTARY/SKIN: NEGATIVE for worrisome rashes, moles or lesions  EYES: NEGATIVE for vision changes or irritation  ENT/MOUTH: NEGATIVE for  ear, mouth and throat problems  RESP: NEGATIVE for significant cough or SOB  CV: NEGATIVE for chest pain, palpitations or peripheral edema  GI: NEGATIVE for nausea, abdominal pain, heartburn, or change in bowel habits  : NEGATIVE for frequency, dysuria, or hematuria  MUSCULOSKELETAL:POSITIVE  for pain in feet, knees, fingers  NEURO: NEGATIVE for weakness, dizziness or paresthesias  ENDOCRINE: NEGATIVE for temperature intolerance, skin/hair changes  HEME: NEGATIVE for bleeding problems  PSYCHIATRIC: NEGATIVE for changes in mood or affect    Patient Active Problem List    Diagnosis Date Noted    Upper respiratory infection 08/28/2023     Priority: Medium    Physical therapy evaluation, initial 08/16/2023     Priority: Medium    Hemorrhoids 08/16/2023     Priority: Medium    Noncompliance with treatment 08/16/2023     Priority: Medium    Osgood-Schlatter's disease 08/16/2023     Priority: Medium    Pain of foot 08/16/2023     Priority: Medium    Plica syndrome 08/16/2023     Priority: Medium     INJECTED .  OFFERED KNEE SCOPE IF SX'S RETURN AFTER INJECTION.  WILL LIKE OR IN SEPT 08.  WILL CALL IN AUG FOR NEW APPT IF SX'S PERSIST.      Pure hypercholesterolemia 08/16/2023     Priority: Medium    Testicular pain 08/16/2023     Priority: Medium    Thrombosed external hemorrhoids 08/16/2023     Priority: Medium    Post-traumatic stress disorder 08/16/2023     Priority: Medium    Arthralgia of multiple joints 08/16/2023     Priority: Medium     RIGHT KNEE XRAYS AND KNEE MRI ORDERED. WILL FOLLOW UP AFTER MRI      Depression 08/16/2023     Priority: Medium    Acute upper respiratory infection 08/16/2023     Priority: Medium    Plantar fasciitis 08/16/2023     Priority: Medium     SILICONE HEEL CUPS.  INSTRUCTED ON STRETCHING PROGRAM      Acquired pes planus 08/16/2023     Priority: Medium    Hallux valgus, unspecified laterality 05/31/2023     Priority: Medium     Added automatically from request for surgery [id number]       "Esophageal reflux 10/03/2013     Priority: Medium      Past Medical History:   Diagnosis Date    GERD (gastroesophageal reflux disease) 10/3/2013     Past Surgical History:   Procedure Laterality Date    SEPTOPLASTY      WISDOM TOOTH EXTRACTION       Current Outpatient Medications   Medication Sig Dispense Refill    fluticasone (FLONASE) 50 MCG/ACT nasal spray Spray 1 spray into both nostrils 2 times daily 16 g 0    ibuprofen 200 MG capsule Take 200 mg by mouth every 4 hours as needed for fever (Patient not taking: Reported on 8/28/2023) 120 capsule        Allergies   Allergen Reactions    Cat Dander [Animal Dander] Unknown    Pollen Extract Other (See Comments)        Social History     Tobacco Use    Smoking status: Never     Passive exposure: Never    Smokeless tobacco: Never   Substance Use Topics    Alcohol use: Yes     Comment: 5 drinks weekly       History   Drug Use No         Objective     /84 (BP Location: Left arm, Patient Position: Sitting, Cuff Size: Adult Regular)   Pulse 64   Temp 98  F (36.7  C) (Oral)   Ht 1.696 m (5' 6.77\")   Wt 87.7 kg (193 lb 4.8 oz)   SpO2 95%   BMI 30.48 kg/m      Physical Exam    GENERAL APPEARANCE: healthy, alert and no distress     EYES: EOMI,  PERRL     HENT: ear canals and TM's normal and nose and mouth without ulcers or lesions     NECK: no adenopathy, no asymmetry, masses, or scars and thyroid normal to palpation     RESP: lungs clear to auscultation - no rales, rhonchi or wheezes     CV: regular rates and rhythm, normal S1 S2, no S3 or S4 and no murmur, click or rub     ABDOMEN:  soft, nontender, no HSM or masses and bowel sounds normal     MS: extremities normal- no gross deformities noted, no evidence of inflammation in joints, FROM in all extremities.     SKIN: no suspicious lesions or rashes     NEURO: Normal strength and tone, sensory exam grossly normal, mentation intact and speech normal     PSYCH: mentation appears normal. and affect normal/bright    "  LYMPHATICS: No cervical adenopathy    Recent Labs   Lab Test 03/07/23  1011   HGB 15.0         POTASSIUM 4.4   CR 0.92        Diagnostics:  No labs were ordered during this visit.   No EKG required, no history of coronary heart disease, significant arrhythmia, peripheral arterial disease or other structural heart disease.    Revised Cardiac Risk Index (RCRI):  The patient has the following serious cardiovascular risks for perioperative complications:   - No serious cardiac risks = 0 points     RCRI Interpretation: 0 points: Class I (very low risk - 0.4% complication rate)         Signed Electronically by: Gagan Kirby MD  Copy of this evaluation report is provided to requesting physician.

## 2023-08-28 NOTE — H&P (VIEW-ONLY)
[facility]  [address] [address]  Phone: [phone]  Fax: [phone]  Primary Provider: Naida Ortiz  Pre-op Performing Provider: RACHEL PALMA      PREOPERATIVE EVALUATION:  Today's date: 8/28/2023    Tu Mora is a 43 year old male who presents for a preoperative evaluation.      8/28/2023     8:59 AM   Additional Questions   Roomed by [facility], Visit Facilitator       Surgical Information:  Surgery/Procedure: Bunionectomy  Surgery Location: [facility]  Surgeon: Dr. Mosquera  Surgery Date: 09/11/2023  Time of Surgery: unknown  Where patient plans to recover: At home with family  Fax number for surgical facility: Note does not need to be faxed, will be available electronically in Epic.    Assessment & Plan     The proposed surgical procedure is considered LOW risk.    Preoperative examination  No concerns.  Patient may proceed with surgery without further testing. Advised patient to switch from occasional ibuprofen to Tylenol 1 week before surgery.    Valgus deformity of both great toes  Patient is low risk for complications from surgery.  Patient may proceed with surgery without further testing. Advised patient to switch from occasional ibuprofen to Tylenol 1 week before surgery.            - No identified additional risk factors other than previously addressed    Antiplatelet or Anticoagulation Medication Instructions:   - Patient is on no antiplatelet or anticoagulation medications.    Additional Medication Instructions:  Patient is on no additional chronic medications    RECOMMENDATION:  APPROVAL GIVEN to proceed with proposed procedure, without further diagnostic evaluation.      20 minutes spent by me on the date of the encounter doing chart review, history and exam, documentation and further activities per the note      Subjective       HPI related to upcoming procedure: Pain with walking of bilateral feet, has bilateral hallux  michellelesly        8/28/2023     8:54 AM   Preop Questions   1. Have you ever had a heart attack or stroke? No   2. Have you ever had surgery on your heart or blood vessels, such as a stent placement, a coronary artery bypass, or surgery on an artery in your head, neck, heart, or legs? No   3. Do you have chest pain with activity? No   4. Do you have a history of  heart failure? No   5. Do you currently have a cold, bronchitis or symptoms of other infection? No   6. Do you have a cough, shortness of breath, or wheezing? No   7. Do you or anyone in your family have previous history of blood clots? No   8. Do you or does anyone in your family have a serious bleeding problem such as prolonged bleeding following surgeries or cuts? No   9. Have you ever had problems with anemia or been told to take iron pills? No   10. Have you had any abnormal blood loss such as black, tarry or bloody stools? No   11. Have you ever had a blood transfusion? No   12. Are you willing to have a blood transfusion if it is medically needed before, during, or after your surgery? Yes   13. Have you or any of your relatives ever had problems with anesthesia? No   14. Do you have sleep apnea, excessive snoring or daytime drowsiness? No   15. Do you have any artifical heart valves or other implanted medical devices like a pacemaker, defibrillator, or continuous glucose monitor? No   16. Do you have artificial joints? No   17. Are you allergic to latex? No       Health Care Directive:  Patient does not have a Health Care Directive or Living Will: Discussed advance care planning with patient; information given to patient to review.    Preoperative Review of :   reviewed - no record of controlled substances prescribed.          Review of Systems  CONSTITUTIONAL: NEGATIVE for fever, chills, change in weight  INTEGUMENTARY/SKIN: NEGATIVE for worrisome rashes, moles or lesions  EYES: NEGATIVE for vision changes or irritation  ENT/MOUTH: NEGATIVE for  ear, mouth and throat problems  RESP: NEGATIVE for significant cough or SOB  CV: NEGATIVE for chest pain, palpitations or peripheral edema  GI: NEGATIVE for nausea, abdominal pain, heartburn, or change in bowel habits  : NEGATIVE for frequency, dysuria, or hematuria  MUSCULOSKELETAL:POSITIVE  for pain in feet, knees, fingers  NEURO: NEGATIVE for weakness, dizziness or paresthesias  ENDOCRINE: NEGATIVE for temperature intolerance, skin/hair changes  HEME: NEGATIVE for bleeding problems  PSYCHIATRIC: NEGATIVE for changes in mood or affect    Patient Active Problem List    Diagnosis Date Noted    Upper respiratory infection 08/28/2023     Priority: Medium    Physical therapy evaluation, initial 08/16/2023     Priority: Medium    Hemorrhoids 08/16/2023     Priority: Medium    Noncompliance with treatment 08/16/2023     Priority: Medium    Osgood-Schlatter's disease 08/16/2023     Priority: Medium    Pain of foot 08/16/2023     Priority: Medium    Plica syndrome 08/16/2023     Priority: Medium     INJECTED .  OFFERED KNEE SCOPE IF SX'S RETURN AFTER INJECTION.  WILL LIKE OR IN SEPT 08.  WILL CALL IN AUG FOR NEW APPT IF SX'S PERSIST.      Pure hypercholesterolemia 08/16/2023     Priority: Medium    Testicular pain 08/16/2023     Priority: Medium    Thrombosed external hemorrhoids 08/16/2023     Priority: Medium    Post-traumatic stress disorder 08/16/2023     Priority: Medium    Arthralgia of multiple joints 08/16/2023     Priority: Medium     RIGHT KNEE XRAYS AND KNEE MRI ORDERED. WILL FOLLOW UP AFTER MRI      Depression 08/16/2023     Priority: Medium    Acute upper respiratory infection 08/16/2023     Priority: Medium    Plantar fasciitis 08/16/2023     Priority: Medium     SILICONE HEEL CUPS.  INSTRUCTED ON STRETCHING PROGRAM      Acquired pes planus 08/16/2023     Priority: Medium    Hallux valgus, unspecified laterality 05/31/2023     Priority: Medium     Added automatically from request for surgery [id number]       "Esophageal reflux 10/03/2013     Priority: Medium      Past Medical History:   Diagnosis Date    GERD (gastroesophageal reflux disease) 10/3/2013     Past Surgical History:   Procedure Laterality Date    SEPTOPLASTY      WISDOM TOOTH EXTRACTION       Current Outpatient Medications   Medication Sig Dispense Refill    fluticasone (FLONASE) 50 MCG/ACT nasal spray Spray 1 spray into both nostrils 2 times daily 16 g 0    ibuprofen 200 MG capsule Take 200 mg by mouth every 4 hours as needed for fever (Patient not taking: Reported on 8/28/2023) 120 capsule        Allergies   Allergen Reactions    Cat Dander [Animal Dander] Unknown    Pollen Extract Other (See Comments)        Social History     Tobacco Use    Smoking status: Never     Passive exposure: Never    Smokeless tobacco: Never   Substance Use Topics    Alcohol use: Yes     Comment: 5 drinks weekly       History   Drug Use No         Objective     /84 (BP Location: Left arm, Patient Position: Sitting, Cuff Size: Adult Regular)   Pulse 64   Temp 98  F (36.7  C) (Oral)   Ht 1.696 m (5' 6.77\")   Wt 87.7 kg (193 lb 4.8 oz)   SpO2 95%   BMI 30.48 kg/m      Physical Exam    GENERAL APPEARANCE: healthy, alert and no distress     EYES: EOMI,  PERRL     HENT: ear canals and TM's normal and nose and mouth without ulcers or lesions     NECK: no adenopathy, no asymmetry, masses, or scars and thyroid normal to palpation     RESP: lungs clear to auscultation - no rales, rhonchi or wheezes     CV: regular rates and rhythm, normal S1 S2, no S3 or S4 and no murmur, click or rub     ABDOMEN:  soft, nontender, no HSM or masses and bowel sounds normal     MS: extremities normal- no gross deformities noted, no evidence of inflammation in joints, FROM in all extremities.     SKIN: no suspicious lesions or rashes     NEURO: Normal strength and tone, sensory exam grossly normal, mentation intact and speech normal     PSYCH: mentation appears normal. and affect normal/bright    "  LYMPHATICS: No cervical adenopathy    Recent Labs   Lab Test 03/07/23  1011   HGB 15.0         POTASSIUM 4.4   CR 0.92        Diagnostics:  No labs were ordered during this visit.   No EKG required, no history of coronary heart disease, significant arrhythmia, peripheral arterial disease or other structural heart disease.    Revised Cardiac Risk Index (RCRI):  The patient has the following serious cardiovascular risks for perioperative complications:   - No serious cardiac risks = 0 points     RCRI Interpretation: 0 points: Class I (very low risk - 0.4% complication rate)         Signed Electronically by: Gagan Kirby MD  Copy of this evaluation report is provided to requesting physician.

## 2023-09-08 ENCOUNTER — ANESTHESIA EVENT (OUTPATIENT)
Dept: SURGERY | Facility: AMBULATORY SURGERY CENTER | Age: 43
End: 2023-09-08
Payer: OTHER GOVERNMENT

## 2023-09-10 ENCOUNTER — HEALTH MAINTENANCE LETTER (OUTPATIENT)
Age: 43
End: 2023-09-10

## 2023-09-11 ENCOUNTER — ANESTHESIA (OUTPATIENT)
Dept: SURGERY | Facility: AMBULATORY SURGERY CENTER | Age: 43
End: 2023-09-11
Payer: OTHER GOVERNMENT

## 2023-09-11 ENCOUNTER — HOSPITAL ENCOUNTER (OUTPATIENT)
Facility: AMBULATORY SURGERY CENTER | Age: 43
Discharge: HOME | End: 2023-09-11
Attending: PODIATRIST
Payer: OTHER GOVERNMENT

## 2023-09-11 VITALS
TEMPERATURE: 97.7 F | HEIGHT: 67 IN | OXYGEN SATURATION: 96 % | BODY MASS INDEX: 30.29 KG/M2 | HEART RATE: 90 BPM | WEIGHT: 193 LBS | SYSTOLIC BLOOD PRESSURE: 105 MMHG | RESPIRATION RATE: 16 BRPM | DIASTOLIC BLOOD PRESSURE: 77 MMHG

## 2023-09-11 DIAGNOSIS — M20.10 HALLUX VALGUS, UNSPECIFIED LATERALITY: ICD-10-CM

## 2023-09-11 PROCEDURE — 28292 COR HLX VLGS RSC PRX PHLX BS: CPT | Mod: 50 | Performed by: PODIATRIST

## 2023-09-11 RX ORDER — ONDANSETRON 4 MG/1
4 TABLET, ORALLY DISINTEGRATING ORAL EVERY 30 MIN PRN
Status: DISCONTINUED | OUTPATIENT
Start: 2023-09-11 | End: 2023-09-12 | Stop reason: HOSPADM

## 2023-09-11 RX ORDER — OXYCODONE HYDROCHLORIDE 5 MG/1
5 TABLET ORAL
Status: COMPLETED | OUTPATIENT
Start: 2023-09-11 | End: 2023-09-11

## 2023-09-11 RX ORDER — HYDROCODONE BITARTRATE AND ACETAMINOPHEN 5; 325 MG/1; MG/1
1-2 TABLET ORAL EVERY 6 HOURS PRN
Qty: 20 TABLET | Refills: 0 | Status: SHIPPED | OUTPATIENT
Start: 2023-09-11 | End: 2023-12-18

## 2023-09-11 RX ORDER — CEPHALEXIN 500 MG/1
500 CAPSULE ORAL 2 TIMES DAILY
Qty: 14 CAPSULE | Refills: 0 | Status: SHIPPED | OUTPATIENT
Start: 2023-09-11 | End: 2023-09-18

## 2023-09-11 RX ORDER — LIDOCAINE 40 MG/G
CREAM TOPICAL
Status: DISCONTINUED | OUTPATIENT
Start: 2023-09-11 | End: 2023-09-12 | Stop reason: HOSPADM

## 2023-09-11 RX ORDER — LIDOCAINE HYDROCHLORIDE 20 MG/ML
INJECTION, SOLUTION INFILTRATION; PERINEURAL PRN
Status: DISCONTINUED | OUTPATIENT
Start: 2023-09-11 | End: 2023-09-11

## 2023-09-11 RX ORDER — ACETAMINOPHEN 325 MG/1
975 TABLET ORAL ONCE
Status: COMPLETED | OUTPATIENT
Start: 2023-09-11 | End: 2023-09-11

## 2023-09-11 RX ORDER — DEXAMETHASONE SODIUM PHOSPHATE 10 MG/ML
INJECTION, SOLUTION INTRAMUSCULAR; INTRAVENOUS PRN
Status: DISCONTINUED | OUTPATIENT
Start: 2023-09-11 | End: 2023-09-11

## 2023-09-11 RX ORDER — ONDANSETRON 2 MG/ML
INJECTION INTRAMUSCULAR; INTRAVENOUS PRN
Status: DISCONTINUED | OUTPATIENT
Start: 2023-09-11 | End: 2023-09-11

## 2023-09-11 RX ORDER — FENTANYL CITRATE 50 UG/ML
INJECTION, SOLUTION INTRAMUSCULAR; INTRAVENOUS PRN
Status: DISCONTINUED | OUTPATIENT
Start: 2023-09-11 | End: 2023-09-11

## 2023-09-11 RX ORDER — ONDANSETRON 2 MG/ML
4 INJECTION INTRAMUSCULAR; INTRAVENOUS EVERY 30 MIN PRN
Status: DISCONTINUED | OUTPATIENT
Start: 2023-09-11 | End: 2023-09-12 | Stop reason: HOSPADM

## 2023-09-11 RX ORDER — SODIUM CHLORIDE, SODIUM LACTATE, POTASSIUM CHLORIDE, CALCIUM CHLORIDE 600; 310; 30; 20 MG/100ML; MG/100ML; MG/100ML; MG/100ML
INJECTION, SOLUTION INTRAVENOUS CONTINUOUS
Status: DISCONTINUED | OUTPATIENT
Start: 2023-09-11 | End: 2023-09-12 | Stop reason: HOSPADM

## 2023-09-11 RX ORDER — CEFAZOLIN SODIUM 2 G/100ML
2 INJECTION, SOLUTION INTRAVENOUS
Status: COMPLETED | OUTPATIENT
Start: 2023-09-11 | End: 2023-09-11

## 2023-09-11 RX ORDER — PROPOFOL 10 MG/ML
INJECTION, EMULSION INTRAVENOUS CONTINUOUS PRN
Status: DISCONTINUED | OUTPATIENT
Start: 2023-09-11 | End: 2023-09-11

## 2023-09-11 RX ORDER — SODIUM CHLORIDE, SODIUM LACTATE, POTASSIUM CHLORIDE, CALCIUM CHLORIDE 600; 310; 30; 20 MG/100ML; MG/100ML; MG/100ML; MG/100ML
INJECTION, SOLUTION INTRAVENOUS CONTINUOUS PRN
Status: DISCONTINUED | OUTPATIENT
Start: 2023-09-11 | End: 2023-09-11

## 2023-09-11 RX ORDER — FENTANYL CITRATE 0.05 MG/ML
25 INJECTION, SOLUTION INTRAMUSCULAR; INTRAVENOUS
Status: DISCONTINUED | OUTPATIENT
Start: 2023-09-11 | End: 2023-09-12 | Stop reason: HOSPADM

## 2023-09-11 RX ORDER — OXYCODONE HYDROCHLORIDE 10 MG/1
10 TABLET ORAL
Status: DISCONTINUED | OUTPATIENT
Start: 2023-09-11 | End: 2023-09-12 | Stop reason: HOSPADM

## 2023-09-11 RX ORDER — PROPOFOL 10 MG/ML
INJECTION, EMULSION INTRAVENOUS PRN
Status: DISCONTINUED | OUTPATIENT
Start: 2023-09-11 | End: 2023-09-11

## 2023-09-11 RX ADMIN — PROPOFOL 20 MG: 10 INJECTION, EMULSION INTRAVENOUS at 07:06

## 2023-09-11 RX ADMIN — PROPOFOL 20 MG: 10 INJECTION, EMULSION INTRAVENOUS at 07:07

## 2023-09-11 RX ADMIN — FENTANYL CITRATE 50 MCG: 50 INJECTION, SOLUTION INTRAMUSCULAR; INTRAVENOUS at 07:07

## 2023-09-11 RX ADMIN — CEFAZOLIN SODIUM 2 G: 2 INJECTION, SOLUTION INTRAVENOUS at 07:07

## 2023-09-11 RX ADMIN — FENTANYL CITRATE 50 MCG: 50 INJECTION, SOLUTION INTRAMUSCULAR; INTRAVENOUS at 07:04

## 2023-09-11 RX ADMIN — PROPOFOL 120 MCG/KG/MIN: 10 INJECTION, EMULSION INTRAVENOUS at 07:06

## 2023-09-11 RX ADMIN — SODIUM CHLORIDE, SODIUM LACTATE, POTASSIUM CHLORIDE, CALCIUM CHLORIDE: 600; 310; 30; 20 INJECTION, SOLUTION INTRAVENOUS at 07:01

## 2023-09-11 RX ADMIN — LIDOCAINE HYDROCHLORIDE 2 ML: 20 INJECTION, SOLUTION INFILTRATION; PERINEURAL at 07:05

## 2023-09-11 RX ADMIN — ONDANSETRON 4 MG: 2 INJECTION INTRAMUSCULAR; INTRAVENOUS at 08:05

## 2023-09-11 RX ADMIN — PROPOFOL 20 MG: 10 INJECTION, EMULSION INTRAVENOUS at 07:08

## 2023-09-11 RX ADMIN — ACETAMINOPHEN 975 MG: 325 TABLET ORAL at 06:22

## 2023-09-11 RX ADMIN — SODIUM CHLORIDE, SODIUM LACTATE, POTASSIUM CHLORIDE, CALCIUM CHLORIDE: 600; 310; 30; 20 INJECTION, SOLUTION INTRAVENOUS at 06:32

## 2023-09-11 RX ADMIN — OXYCODONE HYDROCHLORIDE 5 MG: 5 TABLET ORAL at 08:52

## 2023-09-11 RX ADMIN — DEXAMETHASONE SODIUM PHOSPHATE 10 MG: 10 INJECTION, SOLUTION INTRAMUSCULAR; INTRAVENOUS at 07:10

## 2023-09-11 NOTE — ANESTHESIA POSTPROCEDURE EVALUATION
Patient: Tu Mora    Procedure: Procedure(s):  Modified Parrish Bunionectomy       Anesthesia Type:  MAC    Note:  Disposition: Outpatient   Postop Pain Control: Uneventful            Sign Out: Well controlled pain   PONV: No   Neuro/Psych: Uneventful            Sign Out: Acceptable/Baseline neuro status   Airway/Respiratory: Uneventful            Sign Out: Acceptable/Baseline resp. status   CV/Hemodynamics: Uneventful            Sign Out: Acceptable CV status; No obvious hypovolemia; No obvious fluid overload   Other NRE: NONE   DID A NON-ROUTINE EVENT OCCUR?            Last vitals:  Vitals Value Taken Time   /83 09/11/23 0901   Temp 97.7  F (36.5  C) 09/11/23 0810   Pulse 67 09/11/23 0910   Resp 16 09/11/23 0810   SpO2 98 % 09/11/23 0910   Vitals shown include unvalidated device data.    Electronically Signed By: Cass Gonzalez MD  September 11, 2023  10:59 AM

## 2023-09-11 NOTE — DISCHARGE INSTRUCTIONS
You received 975 mg of acetaminophen (Tylenol) at 6:22 AM. Please do not take an additional dose of Tylenol until after 12:22 PM.    Do not exceed 4,000 mg of acetaminophen in a 24 hour period, keep in mind that acetaminophen can also be found in many over-the-counter cold medications as well as narcotics that may be given for pain.      [facility]   Adult Discharge Orders & Instructions     For 24 hours after surgery    Get plenty of rest.  A responsible adult must stay with you for at least 24 hours after you leave the hospital.   Do not drive or use heavy equipment.  If you have weakness or tingling, don't drive or use heavy equipment until this feeling goes away.  Do not drink alcohol.  Avoid strenuous or risky activities.  Ask for help when climbing stairs.   You may feel lightheaded.  IF so, sit for a few minutes before standing.  Have someone help you get up.   If you have nausea (feel sick to your stomach): Drink only clear liquids such as apple juice, ginger ale, broth or 7-Up.  Rest may also help.  Be sure to drink enough fluids.  Move to a regular diet as you feel able.  You may have a slight fever. Call the doctor if your fever is over 100 F (37.7 C) (taken under the tongue) or lasts longer than 24 hours.  You may have a dry mouth, a sore throat, muscle aches or trouble sleeping.  These should go away after 24 hours.  Do not make important or legal decisions.   Call your doctor for any of the followin.  Signs of infection (fever, growing tenderness at the surgery site, a large amount of drainage or bleeding, severe pain, foul-smelling drainage, redness, swelling).    2. It has been over 8 to 10 hours since surgery and you are still not able to urinate (pass water).    3.  Headache for over 24 hours.

## 2023-09-11 NOTE — ANESTHESIA CARE TRANSFER NOTE
Patient: Tu Mora    Procedure: Procedure(s):  Modified Parrish Bunionectomy       Diagnosis: Hallux valgus, unspecified laterality [M20.10]  Diagnosis Additional Information: No value filed.    Anesthesia Type:   MAC     Note:    Oropharynx: oropharynx clear of all foreign objects  Level of Consciousness: awake  Oxygen Supplementation: room air    Independent Airway: airway patency satisfactory and stable  Dentition: dentition unchanged  Vital Signs Stable: post-procedure vital signs reviewed and stable  Report to RN Given: handoff report given  Patient transferred to: Phase II    Handoff Report: Identifed the Patient, Identified the Reponsible Provider, Reviewed the pertinent medical history, Discussed the surgical course, Reviewed Intra-OP anesthesia mangement and issues during anesthesia, Set expectations for post-procedure period and Allowed opportunity for questions and acknowledgement of understanding      Vitals:  Vitals Value Taken Time   BP     Temp     Pulse 90 09/11/23 0812   Resp     SpO2 96 % 09/11/23 0812   Vitals shown include unvalidated device data.    Electronically Signed By: Cass Gonzalez MD  September 11, 2023  8:14 AM

## 2023-09-11 NOTE — ANESTHESIA PREPROCEDURE EVALUATION
Anesthesia Pre-Procedure Evaluation    Patient: Tu Mora   MRN: [id number] : 1980        Procedure : Procedure(s):  Bunionectomy with first metatarsal osteotomy with internal screw fixation both feet          Past Medical History:   Diagnosis Date    GERD (gastroesophageal reflux disease) 10/03/2013      Past Surgical History:   Procedure Laterality Date    SEPTOPLASTY      WISDOM TOOTH EXTRACTION        Allergies   Allergen Reactions    Cat Dander [Animal Dander] Unknown    Pollen Extract Other (See Comments)      Social History     Tobacco Use    Smoking status: Never     Passive exposure: Never    Smokeless tobacco: Never   Substance Use Topics    Alcohol use: Yes     Comment: 5 drinks weekly      Wt Readings from Last 1 Encounters:   23 87.5 kg (193 lb)        Anesthesia Evaluation   Pt has had prior anesthetic.     No history of anesthetic complications       ROS/MED HX  ENT/Pulmonary:  - neg pulmonary ROS  (-) sleep apnea   Neurologic:  - neg neurologic ROS     Cardiovascular:       METS/Exercise Tolerance:     Hematologic:       Musculoskeletal: Comment: S/f bilateral bunionectomy  (+)  arthritis,             GI/Hepatic:     (+) GERD (remote hx, resolved), Asymptomatic on medication,               (-) hiatal hernia   Renal/Genitourinary:  - neg Renal ROS     Endo:     (+)               Obesity (BMI 30),       Psychiatric/Substance Use:     (+) psychiatric history (ptsd) depression and other (comment)       Infectious Disease:  - neg infectious disease ROS     Malignancy:       Other:            Physical Exam    Airway        Mallampati: II   TM distance: > 3 FB   Neck ROM: full   Mouth opening: > 3 cm    Respiratory Devices and Support         Dental       (+) Minor Abnormalities - some fillings, tiny chips      Cardiovascular   cardiovascular exam normal          Pulmonary   pulmonary exam normal                OUTSIDE LABS:  CBC:   Lab Results   Component Value Date    WBC 6.4  03/07/2023    HGB 15.0 03/07/2023    HCT 45.4 03/07/2023     03/07/2023     BMP:   Lab Results   Component Value Date     03/07/2023    POTASSIUM 4.4 03/07/2023    CHLORIDE 104 03/07/2023    CO2 25 03/07/2023    BUN 20.0 03/07/2023    CR 0.92 03/07/2023    GLC 94 03/07/2023    GLC 87 03/01/2013     COAGS: No results found for: PTT, INR, FIBR  POC: No results found for: BGM, HCG, HCGS  HEPATIC:   Lab Results   Component Value Date    ALBUMIN 4.7 04/18/2023    PROTTOTAL 7.7 04/18/2023    ALT 73 (H) 04/18/2023    AST 37 04/18/2023    ALKPHOS 85 04/18/2023    BILITOTAL 0.6 04/18/2023     OTHER:   Lab Results   Component Value Date    LESLIE 9.4 03/07/2023    TSH 1.89 03/07/2023       Anesthesia Plan    ASA Status:  2    NPO Status:  NPO Appropriate    Anesthesia Type: MAC.     - Reason for MAC: straight local not clinically adequate   Induction: Intravenous, Propofol.   Maintenance: TIVA.        Consents    Anesthesia Plan(s) and associated risks, benefits, and realistic alternatives discussed. Questions answered and patient/representative(s) expressed understanding.     - Discussed:     - Discussed with:  Patient            Postoperative Care    Pain management: Multi-modal analgesia.   PONV prophylaxis: Ondansetron (or other 5HT-3), Dexamethasone or Solumedrol     Comments:    Other Comments: Local per surgeon  Plan for propofol gtt w/  fentanyl PRN for pain.  Discussed potential need to convert to GA w/ ETT vs LMA if not tolerating.  Explained that it is possible to remember certain aspects of the OR experience during MAC dependent on the depth of sedation and patient understands this.  Decadron and zofran for PONV ppx.                Cass Gonzalez MD

## 2023-09-11 NOTE — OP NOTE
Date of surgery: 9/11/2023    Surgeon: Romel Mosquera D.P.M.    Preoperative diagnosis: Hallux abductovalgus bilateral feet foot    Postoperative diagnosis: Same    Procedure: Modified Parrish bunionectomy bilateral feet    Anesthesia: Mac    Hemostasis: Pneumatic ankle tourniquet 250 mmHg    EBL: None    Medication: 2 g Ancef preoperatively    Complications: None    Specimen: None    Description: The patient was taken to the operating room and placed on the table in supine position.  Under local anesthesia of 0.5% Marcaine plain and 2% Xylocaine plain in a 1:1 mixture along with IV sedation both feet were prepped and draped in the usual aseptic manner.  Following exsanguination of the right foot with a Michael's bandage the tourniquet was inflated and the following procedure was performed.    Attention was directed to the dorsal aspect of the right  foot where a dorsal linear longitudinal skin incision was made approximately 3.0; length.  This incision was placed medial to the extensor hallucis longus tendon.  The incision was then deepened via sharp and blunt dissection care was taken to identify and retract all vital structures.  Next a periosteal incision was made the entire length of the original skin incision and the periosteum was reflected medially and laterally.  Next a dorsal capsulotomy was performed at the first metatarsal phalangeal joint.  Next a lateral capsulotomy was performed at the first metatarsal phalangeal joint and this was followed by transection of the extensor hallucis brevis tendon.  Next the medial collateral ligament was resected from their attachment at the head of the first metatarsal and the head was delivered into the into the surgical site.  Next through an Intracapsular approach the adductor hallucis brevis tendon was identified and resected from its attachment at the base of proximal phalanx.  Next utilizing a sagittal saw the hypertrophic bone noted at the medial and dorsal  aspect of the first metatarsal was resected.  The remaining portion of bone was rasped smooth with the micro-rotary bur.  The wound was then flushed with copious amounts of sterile saline solution..  Deep closure was then accomplished utilizing 2-0 and 3-0 Monocryl suture material and skin closure was accomplished utilizing 4-0 nylon suture material.  A sterile dressing was then applied to the right foot comprising of Betadine ointment, Adaptic, 4 x 4 gauze, 3 inch Marialuisa and Coban.  The tourniquet was then deflated and normal color returned to all the digits of the right foot.    Attention was then directed to the left foot where a similar procedure was performed without exception.  The patient appeared to tolerate the procedures and anesthesia well and was transported from the operating room to the recovery room with vital signs stable and neurovascular status intact.      Romel Mosquera DPM

## 2023-09-11 NOTE — INTERVAL H&P NOTE
"I have reviewed the surgical (or preoperative) H&P that is linked to this encounter, and examined the patient. There are no significant changes    Clinical Conditions Present on Arrival:  Clinically Significant Risk Factors Present on Admission                  # Obesity: Estimated body mass index is 30.68 kg/m  as calculated from the following:    Height as of this encounter: 1.689 m (5' 6.5\").    Weight as of this encounter: 87.5 kg (193 lb).       "

## 2023-09-27 ENCOUNTER — ANCILLARY PROCEDURE (OUTPATIENT)
Dept: GENERAL RADIOLOGY | Facility: CLINIC | Age: 43
End: 2023-09-27
Attending: PODIATRIST
Payer: OTHER GOVERNMENT

## 2023-09-27 ENCOUNTER — OFFICE VISIT (OUTPATIENT)
Dept: PODIATRY | Facility: CLINIC | Age: 43
End: 2023-09-27
Payer: OTHER GOVERNMENT

## 2023-09-27 VITALS — DIASTOLIC BLOOD PRESSURE: 80 MMHG | SYSTOLIC BLOOD PRESSURE: 118 MMHG | HEART RATE: 67 BPM | OXYGEN SATURATION: 97 %

## 2023-09-27 DIAGNOSIS — M20.10 HALLUX VALGUS, UNSPECIFIED LATERALITY: Primary | ICD-10-CM

## 2023-09-27 DIAGNOSIS — M20.10 HALLUX VALGUS, UNSPECIFIED LATERALITY: ICD-10-CM

## 2023-09-27 PROCEDURE — 99024 POSTOP FOLLOW-UP VISIT: CPT | Performed by: PODIATRIST

## 2023-09-27 PROCEDURE — 73630 X-RAY EXAM OF FOOT: CPT | Mod: LT | Performed by: PODIATRIST

## 2023-09-27 PROCEDURE — 73630 X-RAY EXAM OF FOOT: CPT | Mod: RT | Performed by: PODIATRIST

## 2023-09-27 NOTE — Clinical Note
9/27/2023         RE: Tu Mora  [address] [place] [address]        Dear Colleague,    Thank you for referring your patient, Tu Mora, to the [facility]. Please see a copy of my visit note below.    Subjective  findings: The patient returns to the clinic today for postop visit #1, 2 weeks status post modified Parrish bunionectomy both feet.  The patient is in good spirits and he had no complaints.    Objective findings: The dressings were removed and wound margins are well coaptated and maintained.  There is no edema, erythema, cellulitis, drainage or bleeding noted.  Neurovascular status is intact.  Vital signs stable.    Assessment: Hallux abductovalgus both feet    Plan: All sutures were removed today.  Postop x-rays of both feet were taken today.  The x-rays were read and interpreted by this physician.  I instructed the patient to gradually return to normal shoe gear and normal activities.  He is to return to the clinic as needed.      Again, thank you for allowing me to participate in the care of your patient.        Sincerely,        Romel Mosquera DPM

## 2023-09-27 NOTE — PROGRESS NOTES
Subjective  findings: The patient returns to the clinic today for postop visit #1, 2 weeks status post modified Parrish bunionectomy both feet.  The patient is in good spirits and he had no complaints.    Objective findings: The dressings were removed and wound margins are well coaptated and maintained.  There is no edema, erythema, cellulitis, drainage or bleeding noted.  Neurovascular status is intact.  Vital signs stable.    Assessment: Hallux abductovalgus both feet    Plan: All sutures were removed today.  Postop x-rays of both feet were taken today.  The x-rays were read and interpreted by this physician.  I instructed the patient to gradually return to normal shoe gear and normal activities.  He is to return to the clinic as needed.

## 2023-10-10 ENCOUNTER — TELEPHONE (OUTPATIENT)
Dept: GASTROENTEROLOGY | Facility: CLINIC | Age: 43
End: 2023-10-10
Payer: OTHER GOVERNMENT

## 2023-10-10 NOTE — TELEPHONE ENCOUNTER
Pre assessment completed for upcoming procedure.      Procedure details:    Patient scheduled for Colonoscopy  on 10/26/23.     Arrival time: 1130. Procedure time 1230    Pre op exam needed? N/A    Facility location: [facility]; [address] [address], 5th Floor, [place] [address]    Sedation type: MAC    Indication for procedure: Screening    COVID policy reviewed.    Designated  policy reviewed. Instructed to have someone stay 24 hours post procedure.       Chart review:     Electronic implanted devices? No    Diabetic? No    Diabetic medication HOLDING recommendations: (if applicable)  Oral diabetic medications: No  Diabetic injectables: No  Insulin: No    Medication review:    Anticoagulants? No    NSAIDS? No    Other medication HOLDING recommendations:  N/A      Prep for procedure:     Bowel prep recommendation: Miralax without magnesium citrate  Due to: standard prep due to recall.    Prep instructions sent via letter     Reviewed procedure prep instructions.     Patient verbalized understanding and had no questions or concerns at this time.        Ct Araiza RN  Endoscopy Procedure Pre Assessment RN  [phone] option 4

## 2023-10-10 NOTE — LETTER
October 10, 2023      Tu Mora  [address] [place] [address]              Dear Tu,        Colonoscopy      Procedure date: 10/26/23    Anticipated arrival time: 1130 AM   (Procedure Times are Subject to Change)    [facility]; [address], 5th Floor, [place] [address] - Check in location: 5th Floor. Parking information: Self pay parking is available in [place] surface lot directly across from the  main entrance of the [facility]. Entry and exit to this lot is on [facility]. In  addition, self pay parking is also available in [address] ([address]).  We have dedicated patient only spots on 1st floor of [address] ramp.  services are available for patients with limited mobility. Services available Monday-Friday, 7:00a.m.-  5:00p.m.      Important Procedure Reminders:     Prep Instructions:   Instructions on how to prepare for your upcoming procedure are found below. Please read instructions carefully. Deviation from instructions may result in less than desired outcomes and procedure may need to be rescheduled. If you have additional questions regarding how to prepare for your upcoming procedure, please contact our endoscopy pre assessment nurses at [phone] option 4.      Policy:   On the day of your procedure, please designatean adult(s) who can drive you home stay with you for the next 24 hours. The medicines used in the exam will make you sleepy. You will not be able to drive. You cannot take public transportation, ride share services, or non-medical taxi service without a responsible caregiver.  Medical transport services are allowed with the requirement that a responsible caregiver will receive you at your destination.  We require that drivers and caregivers are confirmed prior to your procedure.    Day of procedure:  Please do not wear jewelry (i.e. earrings, rings, necklaces, watches, etc) . Leave your purse, billfold,  credit cards, and other valuables at home.   Bring insurance card and ID.     To cancel or reschedule your procedure:   Please call our endoscopy scheduling team at: [facility]: [phone], option 2. Monday through Friday, 7:00am-5:00pm.      Medication Reminders:    Please note the following medication holding recommendations:   N/A    ----------------------------------------------------------------------------------------------------------------------------------------------------------------------------------------------------------------------------------------------------------------------      MiraLAX Bowel Prep WITHOUT Magnesium Citrate  Prep Instructions for your Colonoscopy  Pre-Assessment Phone Number: [facility]; [phone] option 4      Please read these instructions carefully at least 7 days prior to your colonoscopy procedure. Be sure to follow all directions completely. The inside of your colon must be clean to allow for a complete examination for the presence of any growths, polyps, and/or abnormalities, as well as their biopsy or removal. A number of tips are included in order to make this part of the procedure as comfortable as possible.    Getting ready:   You will receive a call from a Nurse to review instructions and health history.  This assessment must be completed prior to your procedure.  Failure to complete the Nurse assessment phone call may result in the procedure being cancelled.  You must arrange for someone to drive you home after your exam. Your colonoscopy cannot be done unless you have a ride. If you need to use public transportation, a responsible caregiver must ride with you and stay with you for a minimum of 24 hours.  Check with your insurance company to be sure they will cover this exam.  Go to the drug store and buy:  Four (4) - Dulcolax (Bisacodyl) 5mg tablets   2 - 8.3 ounce bottles of Miralax   80 ounces of Gatorade (not red or  purple)       7 days before the exam:  Talk to your prescribing provider: If you take blood thinners (such as Coumadin, Plavix, Xarelto, Eliquis, Lovenox, or others), these medications may need to be stopped temporarily before your procedure. Your prescribing provider will tell you what to do.   Talk to your prescribing provider: If you take prescription NSAIDS (such as Sulindac, Celebrex, Mobic, Relafen). Your prescribing provider will tell you what to do.   Stop taking fiber and iron supplements   Stop eating corn, popcorn, nuts and foods that contain seeds. These can stay in the colon for many days and they can clog up the colonoscope.     3 days before the exam:  Begin a low-fiber diet (see below).   Drink at least 4 to 6 large glasses of water or sports drink each day.     One day before the exam:  Only clear liquid diet is allowed (see below). No solid food should be eaten.   Drink at least 8 to 10 full glasses of clear liquids during the day (no red or purple).   Stop taking NSAID pain relievers, such as Advil, Ibuprofen, Motrin, etc.  You may take Tylenol.  The colonoscopy prep is taken in multiple steps. Note that the timing of the final step depends on your arrival time for the examination.   Once you start drinking the solution, stay near a toilet while using this medicine.   Besides diarrhea (watery stools), you may have mild cramping, bloating and nausea.    You may want to use Vaseline on the skin around your anus after each bowel movement to prevent irritation. You can also use wet wipes to prevent irritation.     Bowel cleansing:  At 12 pm, take 2 Dulcolax (Bisacodyl) tablets. This may take a minimum of approximately 6 hours to begin working.  At 4 pm, mix one entire bottle of Miralax with 64 ounces of Gatorade in a pitcher and stir to dissolve the powder. Chill if desired, but do not add ice.  At 5 pm, drink an 8-ounce glass of the Miralax and Gatorade mixture every 15 minutes until the pitcher is  half empty (about 4 glasses). Store the rest in the fridge. You must drink each glass quickly.  Bowel movements usually begin about one hour after drinking the mixture. The stool is likely to be brown at this stage.  Continue to drink clear liquids.  At 10 pm, take 2 Dulcolax (Bisacodyl) tablets and start drinking the remaining half of the pitcher. Drink one 8-ounce glass of the Miralax and Gatorade mixture every 15 minutes until the pitcher is empty (about 4 glasses). Drink each glass quickly.    Day of the exam:  Mix 4 capfuls of Miralax with 16 ounces of Gatorade and stir to dissolve the powder.  4 hours before your arrival time, drink 16 ounces of the Miralax and Gatorade mixture (an 8-ounce glass every 15 minutes). Drink each glass quickly.  You may take your necessary morning medications with sips of water.  Do not take diabetes medicine by mouth until after your exam.  You may drink clear liquids only up until 2 hours before your arrival time.  Do not smoke, chew tobacco, or swallow anything, including water or gum for at least 2 hours before your arrival time. This is a safety issue. Your procedure could be cancelled if you do not follow directions.  Please do not wear jewelry (i.e. earrings, rings, necklaces, watches, etc) . Leave your purse, billfold, credit cards, and other valuables at home.   Please arrive with a responsible caregiver who can take you home after the test and stay with you for 24 hours. The sedation medicine used will make you sleepy and forgetful. If you do not have someone to take you home, we will cancel your procedure. If using public transportation, you must have someone to ride with you.  Please perform your nebulizer treatments and airway clearance therapy in the morning prior to the procedure (if applicable).  If you have asthma, bring your inhalers.       CLEAR LIQUID DIET    You may have:    Water, tea, coffee (no milk or cream)  Soda pop, Gatorade (not red or purple)  Jell-O,  Popsicles (no milk or fruit pieces - not red or purple)  Fat-free soup broth or bouillon  Plain hard candy, such as clear life savers (not red or purple)  Clear juices and fruit-flavored drinks, such as apple juice, white grape juice, Hi-C, and Giancarlo-Aid (not red or purple)   Do not have:    Milk or milk products such as ice cream, malts or shakes, or coffee creamer  Red or purple drinks of any kind such as cranberry juice or grape juice. Avoid red or purple Jell-O, Popsicles, Giancarlo-Aid, sorbet, sherbet and candy  Juices with pulp such as orange, grapefruit, pineapple or tomato juice  Cream soups of any kind  Alcohol and beer  Protein drinks or protein powder     LOW FIBER DIET   You may have:    Starches: White bread, rolls, biscuits, croissants, [place] toast, white flour tortillas, waffles, pancakes, K[place]z toast; white rice, noodles, pasta, macaroni; cooked and peeled potatoes; plain crackers, saltines; cooked farina or cream of rice; puffed rice, corn flakes, Rice Krispies, Special K   Vegetables: tender cooked and canned, vegetable broths  Fruits and fruit juices: Strained fruit juice, canned fruit without seeds or skin (not pineapple), applesauce, pear sauce, ripe bananas, melons (not watermelon)   Milk products: Milk (plain or flavored), cheese, cottage cheese, yogurt (no berries), custard, ice cream    Proteins: Tender, well-cooked ground beef, lamb, veal, ham, pork, chicken, turkey, fish or organ meats; eggs; creamy peanut butter   Fats and condiments:  Margarine, butter, oils, mayonnaise, sour cream, salad dressing, plain gravy; spices, cooked herbs; sugar, clear jelly, honey, syrup   Snacks, sweets and drinks: Pretzels, hard candy; plain cakes and cookies (no nuts or seeds); gelatin, plain pudding, sherbet, Popsicles; coffee, tea, carbonated ( fizzy ) drinks Do not have:    Starches: Breads or rolls that contain nuts, seeds or fruit; whole wheat or whole grain breads that contain more than 1 gram of fiber  per slice; cornbread; corn or whole wheat tortillas; potatoes with skin; brown rice, wild rice, kasha (buckwheat), and oatmeal   Vegetables: Any raw or steamed vegetables; vegetables with seeds; corn in any form   Fruits and fruit juices: Prunes, prune juice, raisins and other dried fruits, berries and other fruits with seeds, canned pineapple juices with pulp such as orange, grapefruit, pineapple or tomato juice  Milk products: Any yogurt with nuts, seeds or berries   Proteins: Tough, fibrous meats with gristle; cooked dried beans, peas or lentils; crunchy peanut butter  Fats and condiments: Pickles, olives, relish, horseradish; jam, marmalade, preserves   Snacks, sweets and drinks: Popcorn, nuts, seeds, granola, coconut, candies made with nuts or seeds; all desserts that contain nuts, seeds, raisins and other dried fruits, coconut, whole grains or bran.      FAQ:    Why should Miralax be mixed with Gatorade or another clear sports drink?   It is important that your body does not develop an imbalance of electrolytes with the large volume of fluid in this prep. Gatorade/sports drinks contains those electrolytes.   Does Miralax have to be mixed with Gatorade?  Gatorade, Powerade, or any of the other sports drinks are acceptable. If you are diabetic, it is acceptable to use the low sugar options, such as Gatorade Zero, Powerade Zero, G2, etc.  Can I put ice in the Gatorade/Miralax mixture?  We prefer that you mix it and put it in the refrigerator to chill instead of using ice. The ice will melt and dilute the laxative.    Why should the Miralax prep be taken in several steps?   The stool is flushed out by a large wave of fluid going through the colon. Just sipping a large volume of the solution will not achieve the desired result. Studies have shown that two smaller waves (or more in some cases) are better than one large one.    Why is the last Miralax dose so close to the exam?   The intestine continues to produce  mucus and waste. Longer intervals between the prep and the exam can lead to less than desired results. However, the stomach must be empty at the time of the exam in order to allow safe sedation. Therefore, there should be nothing by mouth 2 hours before the exam is started.   How do you know if your colon is cleaned out?   After completing the bowel prep, your bowel movements should be all liquid and yellow. Your bowel movements will look similar to urine in the toilet. If there are pieces of stool (poop) in the toilet, or if you can't see to the bottom of the toilet, please call our office for advice. Call [phone] and ask to speak with a nurse.   Why do you need a responsible  to take you home and stay with you?  We require a responsible caregiver to take you home for your safety. The sedation medicines used to relax you during the procedure can impair your judgement and reaction time, and make you forgetful and possibly a little unsteady. Do not drive, make any important decisions, or sign any legal documents for 24 hours after your procedure.   It is normal to feel bloated and gassy after your procedure. Walking will help move the air through your colon. You can take non-aspirin pain relievers that contain acetaminophen (Tylenol).   When can you eat after your procedure?  You may resume your normal diet when you feel ready, unless advised otherwise by the doctor performing your procedure. Do not drink alcohol for 24 hours after your procedure.   You many resume normal activities (work, exercise, etc.) after 24 hours.   When will you get test results?  You should have your procedure results and any lab results (if applicable) by letter, MyChart message, or phone call within 2 weeks. If you have any questions, please call the doctor that referred you for the procedure.       Last Updated: 6/8/2023

## 2023-10-24 NOTE — TELEPHONE ENCOUNTER
Pre assessment completed for upcoming procedure.   (Please see previous telephone encounter notes for complete details)    Patient  returned call.       Procedure details:    Arrival time and facility location reviewed.    Pre op exam needed? N/A    Designated  policy reviewed. Instructed to have someone stay 24 hours post procedure.     COVID policy reviewed.      Medication review:    NSAID medication(s): Ibuprofen (Advil, Motrin): HOLD 1 day before procedure.      Prep for procedure:     Writer reviewed when the dietary changes take place from the procedure prep instructions.Pt declined writer review the remaining portion of the instructions. Pt will call with any questions.       Additional information needed?  N/A      Patient  verbalized understanding and had no questions or concerns at this time.      Nighat Alba RN  Endoscopy Procedure Pre Assessment RN  [phone] option 4

## 2023-10-25 ENCOUNTER — ANESTHESIA EVENT (OUTPATIENT)
Dept: SURGERY | Facility: AMBULATORY SURGERY CENTER | Age: 43
End: 2023-10-25
Payer: OTHER GOVERNMENT

## 2023-10-26 ENCOUNTER — HOSPITAL ENCOUNTER (OUTPATIENT)
Facility: AMBULATORY SURGERY CENTER | Age: 43
Discharge: HOME | End: 2023-10-26
Attending: INTERNAL MEDICINE
Payer: OTHER GOVERNMENT

## 2023-10-26 ENCOUNTER — ANESTHESIA (OUTPATIENT)
Dept: SURGERY | Facility: AMBULATORY SURGERY CENTER | Age: 43
End: 2023-10-26
Payer: OTHER GOVERNMENT

## 2023-10-26 VITALS
BODY MASS INDEX: 30.29 KG/M2 | OXYGEN SATURATION: 100 % | SYSTOLIC BLOOD PRESSURE: 122 MMHG | TEMPERATURE: 97.5 F | HEIGHT: 67 IN | DIASTOLIC BLOOD PRESSURE: 85 MMHG | WEIGHT: 193 LBS | RESPIRATION RATE: 14 BRPM | HEART RATE: 68 BPM

## 2023-10-26 VITALS — HEART RATE: 81 BPM

## 2023-10-26 LAB — COLONOSCOPY: NORMAL

## 2023-10-26 PROCEDURE — 88305 TISSUE EXAM BY PATHOLOGIST: CPT | Mod: 26 | Performed by: PATHOLOGY

## 2023-10-26 PROCEDURE — 45380 COLONOSCOPY AND BIOPSY: CPT | Mod: 59,33 | Performed by: INTERNAL MEDICINE

## 2023-10-26 PROCEDURE — 45385 COLONOSCOPY W/LESION REMOVAL: CPT | Mod: 33 | Performed by: INTERNAL MEDICINE

## 2023-10-26 PROCEDURE — 45381 COLONOSCOPY SUBMUCOUS NJX: CPT | Mod: 33 | Performed by: INTERNAL MEDICINE

## 2023-10-26 PROCEDURE — 88305 TISSUE EXAM BY PATHOLOGIST: CPT | Mod: TC | Performed by: INTERNAL MEDICINE

## 2023-10-26 RX ORDER — LIDOCAINE 40 MG/G
CREAM TOPICAL
Status: DISCONTINUED | OUTPATIENT
Start: 2023-10-26 | End: 2023-10-26 | Stop reason: HOSPADM

## 2023-10-26 RX ORDER — NALOXONE HYDROCHLORIDE 0.4 MG/ML
0.4 INJECTION, SOLUTION INTRAMUSCULAR; INTRAVENOUS; SUBCUTANEOUS
Status: DISCONTINUED | OUTPATIENT
Start: 2023-10-26 | End: 2023-10-27 | Stop reason: HOSPADM

## 2023-10-26 RX ORDER — FLUMAZENIL 0.1 MG/ML
0.2 INJECTION, SOLUTION INTRAVENOUS
Status: DISCONTINUED | OUTPATIENT
Start: 2023-10-26 | End: 2023-10-27 | Stop reason: HOSPADM

## 2023-10-26 RX ORDER — SODIUM CHLORIDE, SODIUM LACTATE, POTASSIUM CHLORIDE, CALCIUM CHLORIDE 600; 310; 30; 20 MG/100ML; MG/100ML; MG/100ML; MG/100ML
INJECTION, SOLUTION INTRAVENOUS CONTINUOUS
Status: DISCONTINUED | OUTPATIENT
Start: 2023-10-26 | End: 2023-10-26 | Stop reason: HOSPADM

## 2023-10-26 RX ORDER — NALOXONE HYDROCHLORIDE 0.4 MG/ML
0.2 INJECTION, SOLUTION INTRAMUSCULAR; INTRAVENOUS; SUBCUTANEOUS
Status: DISCONTINUED | OUTPATIENT
Start: 2023-10-26 | End: 2023-10-27 | Stop reason: HOSPADM

## 2023-10-26 RX ORDER — ONDANSETRON 2 MG/ML
4 INJECTION INTRAMUSCULAR; INTRAVENOUS
Status: DISCONTINUED | OUTPATIENT
Start: 2023-10-26 | End: 2023-10-26 | Stop reason: HOSPADM

## 2023-10-26 RX ORDER — PROPOFOL 10 MG/ML
INJECTION, EMULSION INTRAVENOUS CONTINUOUS PRN
Status: DISCONTINUED | OUTPATIENT
Start: 2023-10-26 | End: 2023-10-26

## 2023-10-26 RX ORDER — PROPOFOL 10 MG/ML
INJECTION, EMULSION INTRAVENOUS PRN
Status: DISCONTINUED | OUTPATIENT
Start: 2023-10-26 | End: 2023-10-26

## 2023-10-26 RX ORDER — LIDOCAINE HYDROCHLORIDE 20 MG/ML
INJECTION, SOLUTION INFILTRATION; PERINEURAL PRN
Status: DISCONTINUED | OUTPATIENT
Start: 2023-10-26 | End: 2023-10-26

## 2023-10-26 RX ORDER — ONDANSETRON 4 MG/1
4 TABLET, ORALLY DISINTEGRATING ORAL EVERY 6 HOURS PRN
Status: DISCONTINUED | OUTPATIENT
Start: 2023-10-26 | End: 2023-10-27 | Stop reason: HOSPADM

## 2023-10-26 RX ORDER — PROCHLORPERAZINE MALEATE 10 MG
10 TABLET ORAL EVERY 6 HOURS PRN
Status: DISCONTINUED | OUTPATIENT
Start: 2023-10-26 | End: 2023-10-27 | Stop reason: HOSPADM

## 2023-10-26 RX ORDER — ONDANSETRON 2 MG/ML
4 INJECTION INTRAMUSCULAR; INTRAVENOUS EVERY 6 HOURS PRN
Status: DISCONTINUED | OUTPATIENT
Start: 2023-10-26 | End: 2023-10-27 | Stop reason: HOSPADM

## 2023-10-26 RX ADMIN — PROPOFOL 40 MG: 10 INJECTION, EMULSION INTRAVENOUS at 13:11

## 2023-10-26 RX ADMIN — PROPOFOL 150 MCG/KG/MIN: 10 INJECTION, EMULSION INTRAVENOUS at 13:08

## 2023-10-26 RX ADMIN — LIDOCAINE HYDROCHLORIDE 100 MG: 20 INJECTION, SOLUTION INFILTRATION; PERINEURAL at 13:08

## 2023-10-26 RX ADMIN — PROPOFOL 40 MG: 10 INJECTION, EMULSION INTRAVENOUS at 13:24

## 2023-10-26 RX ADMIN — SODIUM CHLORIDE, SODIUM LACTATE, POTASSIUM CHLORIDE, CALCIUM CHLORIDE: 600; 310; 30; 20 INJECTION, SOLUTION INTRAVENOUS at 13:06

## 2023-10-26 RX ADMIN — PROPOFOL 40 MG: 10 INJECTION, EMULSION INTRAVENOUS at 13:13

## 2023-10-26 RX ADMIN — PROPOFOL 30 MG: 10 INJECTION, EMULSION INTRAVENOUS at 13:28

## 2023-10-26 NOTE — ANESTHESIA POSTPROCEDURE EVALUATION
Patient: Tu Mora    Procedure: Procedure(s):  COLONOSCOPY, WITH POLYPECTOMY AND BIOPSY       Anesthesia Type:  MAC    Note:  Disposition: Outpatient   Postop Pain Control: Uneventful            Sign Out: Well controlled pain   PONV: No   Neuro/Psych: Uneventful            Sign Out: Acceptable/Baseline neuro status   Airway/Respiratory: Uneventful            Sign Out: Acceptable/Baseline resp. status   CV/Hemodynamics: Uneventful            Sign Out: Acceptable CV status; No obvious hypovolemia; No obvious fluid overload   Other NRE:    DID A NON-ROUTINE EVENT OCCUR? No           Last vitals:  Vitals Value Taken Time   /85 10/26/23 1355   Temp 36.4  C (97.5  F) 10/26/23 1355   Pulse     Resp 14 10/26/23 1355   SpO2 100 % 10/26/23 1355       Electronically Signed By: Jimbo Danielle MD  October 26, 2023  3:20 PM

## 2023-10-26 NOTE — H&P
Tu Mora  [id number]  male  43 year old      Reason for procedure/surgery: screening    Patient Active Problem List   Diagnosis    Esophageal reflux    Hallux valgus, unspecified laterality    Physical therapy evaluation, initial    Hemorrhoids    Noncompliance with treatment    Osgood-Schlatter's disease    Pain of foot    Plica syndrome    Pure hypercholesterolemia    Testicular pain    Thrombosed external hemorrhoids    Post-traumatic stress disorder    Arthralgia of multiple joints    Depression    Plantar fasciitis    Acquired pes planus       Past Surgical History:    Past Surgical History:   Procedure Laterality Date    BUNIONECTOMY CHEVRON AND IFEOMA, COMBINED Bilateral 9/11/2023    Procedure: Modified Parrish Bunionectomy;  Surgeon: Romel Mosquera DPM;  Location: [facility]    SEPTOPLASTY      WISDOM TOOTH EXTRACTION         Past Medical History:   Past Medical History:   Diagnosis Date    GERD (gastroesophageal reflux disease) 10/03/2013       Social History:   Social History     Tobacco Use    Smoking status: Never     Passive exposure: Never    Smokeless tobacco: Never   Substance Use Topics    Alcohol use: Yes     Comment: 5 drinks weekly       Family History:   Family History   Problem Relation Age of Onset    Sleep Apnea Mother     Hypertension Father        Allergies:   Allergies   Allergen Reactions    Cat Dander [Animal Dander] Unknown    Pollen Extract Other (See Comments)       Active Medications:   Current Outpatient Medications   Medication Sig Dispense Refill    fluticasone (FLONASE) 50 MCG/ACT nasal spray Spray 1 spray into both nostrils 2 times daily (Patient not taking: Reported on 9/27/2023) 16 g 0    HYDROcodone-acetaminophen (NORCO) 5-325 MG tablet Take 1-2 tablets by mouth every 6 hours as needed for moderate to severe pain (Patient not taking: Reported on 9/27/2023) 20 tablet 0       Systemic Review:   CONSTITUTIONAL: NEGATIVE for fever, chills, change in  "weight  ENT/MOUTH: NEGATIVE for ear, mouth and throat problems  RESP: NEGATIVE for significant cough or SOB  CV: NEGATIVE for chest pain, palpitations or peripheral edema    Physical Examination:   Vital Signs: /75   Pulse 68   Temp 97.8  F (36.6  C) (Temporal)   Resp 14   Ht 1.689 m (5' 6.5\")   Wt 87.5 kg (193 lb)   SpO2 99%   BMI 30.68 kg/m    GENERAL: healthy, alert and no distress  RESP: lungs clear to auscultation - no rales, rhonchi or wheezes  CV: regular rate and rhythm, normal S1 S2, no S3 or S4, no murmur, click or rub, no peripheral edema and peripheral pulses strong  ABDOMEN: soft, nontender, no hepatosplenomegaly, no masses and bowel sounds normal  MS: no gross musculoskeletal defects noted, no edema    Plan: Appropriate to proceed as scheduled.      Rosalind García MD  10/26/2023    PCP:  Naida Ortiz"

## 2023-10-26 NOTE — ANESTHESIA PREPROCEDURE EVALUATION
Anesthesia Pre-Procedure Evaluation    Patient: Tu Mora   MRN: [id number] : 1980        Procedure : Procedure(s):  Colonoscopy          Past Medical History:   Diagnosis Date    GERD (gastroesophageal reflux disease) 10/03/2013      Past Surgical History:   Procedure Laterality Date    BUNIONECTOMY CHEVRON AND IFEOMA, COMBINED Bilateral 2023    Procedure: Modified Parrish Bunionectomy;  Surgeon: Romel Mosquera DPM;  Location: [facility]    SEPTOPLASTY      WISDOM TOOTH EXTRACTION        Allergies   Allergen Reactions    Cat Dander [Animal Dander] Unknown    Pollen Extract Other (See Comments)      Social History     Tobacco Use    Smoking status: Never     Passive exposure: Never    Smokeless tobacco: Never   Substance Use Topics    Alcohol use: Yes     Comment: 5 drinks weekly      Wt Readings from Last 1 Encounters:   23 87.5 kg (193 lb)        Anesthesia Evaluation   Pt has had prior anesthetic.     No history of anesthetic complications       ROS/MED HX  ENT/Pulmonary:  - neg pulmonary ROS  (-) sleep apnea   Neurologic:  - neg neurologic ROS     Cardiovascular:    (-) murmur   METS/Exercise Tolerance:     Hematologic:       Musculoskeletal:   (+)  arthritis,             GI/Hepatic:     (+) GERD (remote hx, resolved), Asymptomatic on medication,      bowel prep,         (-) hiatal hernia   Renal/Genitourinary:  - neg Renal ROS     Endo:     (+)               Obesity (BMI 30),       Psychiatric/Substance Use:     (+) psychiatric history (ptsd) depression and other (comment)       Infectious Disease:  - neg infectious disease ROS     Malignancy:       Other:            Physical Exam    Airway        Mallampati: I   TM distance: > 3 FB   Neck ROM: full   Mouth opening: > 3 cm    Respiratory Devices and Support         Dental       (+) Completely normal teeth      Cardiovascular          Rhythm and rate: regular and normal (-) no murmur    Pulmonary   pulmonary exam normal         "breath sounds clear to auscultation           OUTSIDE LABS:  CBC:   Lab Results   Component Value Date    WBC 6.4 03/07/2023    HGB 15.0 03/07/2023    HCT 45.4 03/07/2023     03/07/2023     BMP:   Lab Results   Component Value Date     03/07/2023    POTASSIUM 4.4 03/07/2023    CHLORIDE 104 03/07/2023    CO2 25 03/07/2023    BUN 20.0 03/07/2023    CR 0.92 03/07/2023    GLC 94 03/07/2023    GLC 87 03/01/2013     COAGS: No results found for: \"PTT\", \"INR\", \"FIBR\"  POC: No results found for: \"BGM\", \"HCG\", \"HCGS\"  HEPATIC:   Lab Results   Component Value Date    ALBUMIN 4.7 04/18/2023    PROTTOTAL 7.7 04/18/2023    ALT 73 (H) 04/18/2023    AST 37 04/18/2023    ALKPHOS 85 04/18/2023    BILITOTAL 0.6 04/18/2023     OTHER:   Lab Results   Component Value Date    LESLIE 9.4 03/07/2023    TSH 1.89 03/07/2023       Anesthesia Plan    ASA Status:  2    NPO Status:  NPO Appropriate    Anesthesia Type: MAC.     - Reason for MAC: immobility needed   Induction: Intravenous, Propofol.   Maintenance: TIVA.        Consents    Anesthesia Plan(s) and associated risks, benefits, and realistic alternatives discussed. Questions answered and patient/representative(s) expressed understanding.     - Discussed:     - Discussed with:  Patient      - Extended Intubation/Ventilatory Support Discussed: No.      - Patient is DNR/DNI Status: No     Use of blood products discussed: No .     Postoperative Care    Pain management: IV analgesics.   PONV prophylaxis: Ondansetron (or other 5HT-3), Background Propofol Infusion     Comments:    Other Comments: Discussed plan for IV anesthetic with native airway. Discussed potential need for conversion to general anesthetic with airway management and potential for recall.              Jimbo Danielle MD  " [facility]

## 2023-10-26 NOTE — ANESTHESIA CARE TRANSFER NOTE
Patient: Tu Mora    Procedure: Procedure(s):  COLONOSCOPY, WITH POLYPECTOMY AND BIOPSY       Diagnosis: Special screening for malignant neoplasms, colon [Z12.11]  Diagnosis Additional Information: No value filed.    Anesthesia Type:   MAC     Note:    Oropharynx: oropharynx clear of all foreign objects and spontaneously breathing  Level of Consciousness: awake  Oxygen Supplementation: room air    Independent Airway: airway patency satisfactory and stable  Dentition: dentition unchanged  Vital Signs Stable: post-procedure vital signs reviewed and stable  Report to RN Given: handoff report given  Patient transferred to: Phase II    Handoff Report: Identifed the Patient, Identified the Reponsible Provider, Reviewed the pertinent medical history, Discussed the surgical course, Reviewed Intra-OP anesthesia mangement and issues during anesthesia, Set expectations for post-procedure period and Allowed opportunity for questions and acknowledgement of understanding      Vitals:  Vitals Value Taken Time   /75 10/26/23 1335   Temp 36.6  C (97.8  F) 10/26/23 1335   Pulse 86    Resp 14 10/26/23 1335   SpO2 99 % 10/26/23 1335       Electronically Signed By: BENIGNO Connor CRNA  October 26, 2023  1:39 PM

## 2023-10-27 LAB
PATH REPORT.COMMENTS IMP SPEC: NORMAL
PATH REPORT.COMMENTS IMP SPEC: NORMAL
PATH REPORT.FINAL DX SPEC: NORMAL
PATH REPORT.GROSS SPEC: NORMAL
PATH REPORT.MICROSCOPIC SPEC OTHER STN: NORMAL
PATH REPORT.RELEVANT HX SPEC: NORMAL
PHOTO IMAGE: NORMAL

## 2023-11-02 ENCOUNTER — TELEPHONE (OUTPATIENT)
Dept: GASTROENTEROLOGY | Facility: CLINIC | Age: 43
End: 2023-11-02

## 2023-11-02 NOTE — TELEPHONE ENCOUNTER
----- Message from Rosalind García MD sent at 10/31/2023 11:06 AM CDT -----  Regarding: follow up in clinic  Hi can we get the patient a GI clinic follow up sometime over the next few months

## 2023-11-07 NOTE — TELEPHONE ENCOUNTER
Spoke with Tu, offer pt appt on 12/20/23 at 10:40am in person with Dr. García. He accepted.    [facility] Team,  Please schedule pt on slot above.    Thank you.  Jameson

## 2023-12-18 ENCOUNTER — ANCILLARY PROCEDURE (OUTPATIENT)
Dept: GENERAL RADIOLOGY | Facility: CLINIC | Age: 43
End: 2023-12-18
Attending: NURSE PRACTITIONER
Payer: OTHER GOVERNMENT

## 2023-12-18 ENCOUNTER — OFFICE VISIT (OUTPATIENT)
Dept: FAMILY MEDICINE | Facility: CLINIC | Age: 43
End: 2023-12-18
Payer: OTHER GOVERNMENT

## 2023-12-18 VITALS
TEMPERATURE: 100 F | HEIGHT: 67 IN | RESPIRATION RATE: 20 BRPM | WEIGHT: 193.9 LBS | SYSTOLIC BLOOD PRESSURE: 118 MMHG | OXYGEN SATURATION: 98 % | DIASTOLIC BLOOD PRESSURE: 78 MMHG | HEART RATE: 107 BPM | BODY MASS INDEX: 30.43 KG/M2

## 2023-12-18 DIAGNOSIS — R50.9 FEVER, UNSPECIFIED FEVER CAUSE: ICD-10-CM

## 2023-12-18 DIAGNOSIS — R05.2 SUBACUTE COUGH: ICD-10-CM

## 2023-12-18 DIAGNOSIS — J18.9 PNEUMONIA OF RIGHT LOWER LOBE DUE TO INFECTIOUS ORGANISM: ICD-10-CM

## 2023-12-18 DIAGNOSIS — R05.2 SUBACUTE COUGH: Primary | ICD-10-CM

## 2023-12-18 LAB
FLUAV RNA SPEC QL NAA+PROBE: NEGATIVE
FLUBV RNA RESP QL NAA+PROBE: NEGATIVE
RSV RNA SPEC NAA+PROBE: NEGATIVE
SARS-COV-2 RNA RESP QL NAA+PROBE: NEGATIVE

## 2023-12-18 PROCEDURE — 87637 SARSCOV2&INF A&B&RSV AMP PRB: CPT | Performed by: NURSE PRACTITIONER

## 2023-12-18 PROCEDURE — 71046 X-RAY EXAM CHEST 2 VIEWS: CPT | Mod: TC | Performed by: RADIOLOGY

## 2023-12-18 PROCEDURE — 99214 OFFICE O/P EST MOD 30 MIN: CPT | Performed by: NURSE PRACTITIONER

## 2023-12-18 ASSESSMENT — ENCOUNTER SYMPTOMS: COUGH: 1

## 2023-12-18 NOTE — PROGRESS NOTES
Assessment & Plan     Subacute cough  This is a 44 yo M with no relevant PMH, presenting with Worsening URI symptoms since end of November 2023. Cold symptoms improved 12/11 and then got progressively worse 12/15. 12/15 developed a fever, body aches, bilateral ear pain, sore throat, and SOB. Today, patient presents with a strong, productive cough, fatigue, bilateral ear pain, and reports intermittent fevers. Physical exam relived tachycardia, maxillary sinus pressure, crackles and intermittent wheezes with lung ausculation.     MDM - Differential diagnoses including pneumonia, bacterial sinusitis, COVID, influenza, and RSV.  Will collect chest X-ray to r/o pneumonia, and collect influenza, COVID, and RSV PCR. If all of those are negative, will treat patient for bacterial sinusitis.     -Discussed original symptoms are most likely d/t URI. Worsening symptoms most likely caused by an opportunistic infection.   -Discussed sleeping with head of bed elevated, using a humidifier, continue deep breathing exercises.   -Discussed if chest X-ray is positive for pneumonia will consider 1g amoxicillin TID for 5 days + Azithromycin 500 mg once and then 250 mg daily for 4 days. Patient denies recent abx use or abx related allergies.   - Consider amoxicillin 500 mg TID if treating for bacterial sinusitis.   -Will consider appropriate antivirals if positive for COVID, Influenza, or RSV.     Plan:   - Symptomatic Influenza A/B, RSV, & SARS-CoV2 PCR (COVID-19)  - XR Chest 2 Views  - Symptomatic Influenza A/B, RSV, & SARS-CoV2 PCR (COVID-19) Nose  - Follow up in clinic if worsening symptoms or go to the nearest emergency department if develops worsening SOB or severe chest pain.     Fever, unspecified fever cause  See above.     Plan:  - Symptomatic Influenza A/B, RSV, & SARS-CoV2 PCR (COVID-19)  - XR Chest 2 Views  - Symptomatic Influenza A/B, RSV, & SARS-CoV2 PCR (COVID-19) Nose               BMI:   Estimated body mass index is  "30.83 kg/m  as calculated from the following:    Height as of this encounter: 1.689 m (5' 6.5\").    Weight as of this encounter: 88 kg (193 lb 14.4 oz).   Weight management plan: Patient was referred to their PCP to discuss a diet and exercise plan.        BENIGNO Ohara CNP, [employer], FNP student on 12/18/2023 at 11:57 AM  [facility]    Leeroy Mae is a 43 year old, presenting for the following health issues:  Follow Up (Cough since November. Also ears pain as well.) and Cough      12/18/2023    10:53 AM   Additional Questions   Roomed by LC-LPN   Accompanied by N/A       Cough    History of Present Illness       Reason for visit:  Cough  Symptom onset:  3-4 weeks ago  Symptom intensity:  Severe  Symptom progression:  Worsening  Had these symptoms before:  No    He eats 2-3 servings of fruits and vegetables daily.He consumes 1 sweetened beverage(s) daily.He exercises with enough effort to increase his heart rate 9 or less minutes per day.  He exercises with enough effort to increase his heart rate 3 or less days per week.   He is taking medications regularly.    End of November 2023 - URI symptoms started with sinus pressure, PND, and productive cough. Denied sneezing or fever.    Tested for COVID negative.     Symptoms improved last week Monday (12/11), but then got worse Friday (12/15).     Friday (12/15) - Fever, body aches, bilateral ear pain, productive cough, PND, SOB, sinus pressure, and intermittent sore throat. Over weekend, coughed to the point of vomiting 3 times. Tested for COVID on the 15th - negative   Using DayQuil severe and Nyquil. No other use of mediations.     Poor sleep, fatigued. Wakes up during the night d/t cough. Sleeps with head of bed elevated and uses a humidifier.     No hx of asthma or travel history. 3 kids in school - who have had colds this winter. Denies weight loss, loss of taste or smell, hearing changes, vision " "changes, bowel changes, rashes, or dizziness. Appetite is fair.      12/18 - fever of 100.4                 Review of Systems   Respiratory:  Positive for cough.             Objective    /78 (BP Location: Right arm, Patient Position: Sitting, Cuff Size: Adult Regular)   Pulse 107   Temp 100  F (37.8  C) (Oral)   Resp 20   Ht 1.689 m (5' 6.5\")   Wt 88 kg (193 lb 14.4 oz)   SpO2 98%   BMI 30.83 kg/m    Body mass index is 30.83 kg/m .  Physical Exam   GENERAL: Fatigued, alert and no distress, ill appearing  HENT: ear canals and TM's normal, nose and mouth without ulcers or lesions. Mucous membranes erythematous without tonsillar swelling or exudate. Maxillary sinus pressure on palpation.   NECK: submandibular adenopathy, no asymmetry, masses, or scars and thyroid normal to palpation  RESP: lungs fine crackles with intermittent expiratory wheezes on auscultation. Productive, strong cough.   CV: regular rate and rhythm, normal S1 S2, no S3 or S4, no murmur, click or rub. Tachycardia. no peripheral edema and peripheral pulses strong  MS: no gross musculoskeletal defects noted, no edema  SKIN: no suspicious lesions or rashes  NEURO: Appropriate strength and tone, mentation intact and speech clear  PSYCH: mentation intact, flat affect                      "

## 2023-12-19 RX ORDER — AZITHROMYCIN 250 MG/1
TABLET, FILM COATED ORAL
Qty: 6 TABLET | Refills: 0 | Status: SHIPPED | OUTPATIENT
Start: 2023-12-19 | End: 2023-12-24

## 2023-12-19 NOTE — RESULT ENCOUNTER NOTE
An antibiotic _ azithromycin, has been sent to the pharmacy for treatment of your right Lower lobe pneumonia.  It can be determined that the pneumonia we are seeing is from a bacteria or a virus but given your symptoms and fevers I see it necessary to give you this medication.  Please take as directed on the prescription.  If no improvement is seen in 5 days please notify me.     Please let me know if you have any questions or concerns.    Thank you for trusting us with your care. It was a pleasure seeing you.  BENIGNO Ohara CNP on 12/19/2023 at 1:27 PM

## 2023-12-20 ENCOUNTER — OFFICE VISIT (OUTPATIENT)
Dept: GASTROENTEROLOGY | Facility: CLINIC | Age: 43
End: 2023-12-20
Payer: OTHER GOVERNMENT

## 2023-12-20 VITALS
WEIGHT: 193 LBS | OXYGEN SATURATION: 95 % | DIASTOLIC BLOOD PRESSURE: 79 MMHG | BODY MASS INDEX: 30.29 KG/M2 | HEIGHT: 67 IN | TEMPERATURE: 98.3 F | HEART RATE: 99 BPM | SYSTOLIC BLOOD PRESSURE: 113 MMHG

## 2023-12-20 DIAGNOSIS — R74.01 TRANSAMINITIS: ICD-10-CM

## 2023-12-20 DIAGNOSIS — R15.2 FECAL URGENCY: ICD-10-CM

## 2023-12-20 DIAGNOSIS — R14.0 BLOATING: Primary | ICD-10-CM

## 2023-12-20 DIAGNOSIS — R19.7 DIARRHEA, UNSPECIFIED TYPE: ICD-10-CM

## 2023-12-20 PROCEDURE — 99214 OFFICE O/P EST MOD 30 MIN: CPT | Performed by: INTERNAL MEDICINE

## 2023-12-20 RX ORDER — ALBUTEROL SULFATE 90 UG/1
2 AEROSOL, METERED RESPIRATORY (INHALATION) EVERY 6 HOURS PRN
COMMUNITY

## 2023-12-20 ASSESSMENT — PAIN SCALES - GENERAL: PAINLEVEL: NO PAIN (0)

## 2023-12-20 NOTE — LETTER
12/20/2023         RE: Tu Mora  [email or web address]  [place] [address]        Dear Colleague,    Thank you for referring your patient, Tu Mora, to the [facility] [place]. Please see a copy of my visit note below.      GASTROENTEROLOGY Follow up    CC/REFERRING MD:    Naida Ortiz    HISTORY OF PRESENT ILLNESS:    Tu Mora is a 43 year old male who is being evaluated in GI clinic today.    Colonoscopy 10/26/23    On Abx x 1 day so far, Z Pack for PNA    Tried increasing fiber, helped a little but, still has fecal urgency shortly after eating.  Notices it in the evening and morning but not as much during the day although does not have regular scheduled lunch.  Also admits to bloating after eating    Increased fiber: instead of eggs has greek yogurt and high fiber cereal also tried to increase fruits and veggies.  Felt this made the stool more formed, and also now the episodes of urgency are a bit less often.  Drinks maybe a cup of coffee a day, no more. Drinks one cafeeinated beverage a day total.  Has been happening since 2010 because he was at [facility] and remembers an event where he could not make it to the bathroom.    Possibly related to stress.  Takes imodium sparingly to prevent episodes if he is worried about being out.    Has tried metamucil, again this helped form the stool but did not solve the problem of urgency after eating entirely.     During gluten challenge did not have increased symptoms. Main reason for being gluten free is just for convenience and is not exlcusively gluten free household other than his wife who has celiac disease.     Still has gallbladder      I have reviewed and updated the patient's Past Medical History, Social History, Family History and Medication List.    ALLERGIES  Cat dander [animal dander] and Pollen extract    PE/  /79   Pulse 99   Temp 98.3  F (36.8  C)   Ht 1.689 m (5'  "6.5\")   Wt 87.5 kg (193 lb)   SpO2 95%   BMI 30.68 kg/m    Gen: pleasant NAD  HEENT: hearing intact  Psych: AOx3, normal mood and affect    PERTINENT STUDIES have been reviewed.    Impression/Recommendations:  Tu Mora is a 43 year old male who presents for follow up of chronic intermittent post-prandial fecal urgency ongoing since at least 2010.  No alarm symptoms.  Likely a manifestation of IBS-D vs SIBO vs combination of both.  -- colonoscopy: complete / plan for surveillance in 7 yrs / FDRs: recommendation will to be for colonoscopy age 33; patient has 3 younger siblings.  -- hepatic panel -  -- SIBO diet to patient - send via [employer]  -- fiber chart to patient via [employer], goal is 35g/fiber a day. Patient currently getting about 20g/day  -- breath test  -- try metamucil again - has [employer] Psyllium powder - he has tried this in the past - try it more regularly.  Let us know how he feels after 5 day of Z pack      RTC 6 months earlier prn    Thank you for this consultation.  It was a pleasure to participate in the care of this patient; please contact us with any further questions.          Again, thank you for allowing me to participate in the care of your patient.      Sincerely,    Rosalind García MD  "

## 2023-12-20 NOTE — NURSING NOTE
"Chief Complaint   Patient presents with    Follow Up       Vitals:    12/20/23 1037   BP: 113/79   Pulse: 99   Temp: 98.3  F (36.8  C)   SpO2: 95%   Weight: 87.5 kg (193 lb)   Height: 1.689 m (5' 6.5\")       Body mass index is 30.68 kg/m .    [employer]    "

## 2023-12-20 NOTE — PROGRESS NOTES
"  GASTROENTEROLOGY Follow up    CC/REFERRING MD:    Naida Ortiz    HISTORY OF PRESENT ILLNESS:    Tu Mora is a 43 year old male who is being evaluated in GI clinic today.    Colonoscopy 10/26/23    On Abx x 1 day so far, Z Pack for PNA    Tried increasing fiber, helped a little but, still has fecal urgency shortly after eating.  Notices it in the evening and morning but not as much during the day although does not have regular scheduled lunch.  Also admits to bloating after eating    Increased fiber: instead of eggs has greek yogurt and high fiber cereal also tried to increase fruits and veggies.  Felt this made the stool more formed, and also now the episodes of urgency are a bit less often.  Drinks maybe a cup of coffee a day, no more. Drinks one cafeeinated beverage a day total.  Has been happening since 2010 because he was at [place] in [place] and remembers an event where he could not make it to the bathroom.    Possibly related to stress.  Takes imodium sparingly to prevent episodes if he is worried about being out.    Has tried metamucil, again this helped form the stool but did not solve the problem of urgency after eating entirely.     During gluten challenge did not have increased symptoms. Main reason for being gluten free is just for convenience and is not exlcusively gluten free household other than his wife who has celiac disease.     Still has gallbladder      I have reviewed and updated the patient's Past Medical History, Social History, Family History and Medication List.    ALLERGIES  Cat dander [animal dander] and Pollen extract    PE/  /79   Pulse 99   Temp 98.3  F (36.8  C)   Ht 1.689 m (5' 6.5\")   Wt 87.5 kg (193 lb)   SpO2 95%   BMI 30.68 kg/m    Gen: pleasant NAD  HEENT: hearing intact  Psych: AOx3, normal mood and affect    PERTINENT STUDIES have been reviewed.    Impression/Recommendations:  Tu Mora is a 43 year old male who presents for follow up of " chronic intermittent post-prandial fecal urgency ongoing since at least 2010.  No alarm symptoms.  Likely a manifestation of IBS-D vs SIBO vs combination of both.  -- colonoscopy: complete / plan for surveillance in 7 yrs / FDRs: recommendation will to be for colonoscopy age 33; patient has 3 younger siblings.  -- hepatic panel -  -- SIBO diet to patient - send via [employer]  -- fiber chart to patient via [employer], goal is 35g/fiber a day. Patient currently getting about 20g/day  -- breath test  -- try metamucil again - has [employer] Psyllium powder - he has tried this in the past - try it more regularly.  Let us know how he feels after 5 day of Z pack        RTC 6 months earlier prn    Thank you for this consultation.  It was a pleasure to participate in the care of this patient; please contact us with any further questions.

## 2023-12-27 ENCOUNTER — IMMUNIZATION (OUTPATIENT)
Dept: FAMILY MEDICINE | Facility: CLINIC | Age: 43
End: 2023-12-27
Payer: OTHER GOVERNMENT

## 2023-12-27 PROCEDURE — 90686 IIV4 VACC NO PRSV 0.5 ML IM: CPT

## 2023-12-27 PROCEDURE — 90471 IMMUNIZATION ADMIN: CPT

## 2023-12-27 PROCEDURE — 91320 SARSCV2 VAC 30MCG TRS-SUC IM: CPT

## 2023-12-27 PROCEDURE — 90480 ADMN SARSCOV2 VAC 1/ONLY CMP: CPT

## 2024-11-02 ENCOUNTER — HEALTH MAINTENANCE LETTER (OUTPATIENT)
Age: 44
End: 2024-11-02

## 2024-11-13 ENCOUNTER — IMMUNIZATION (OUTPATIENT)
Dept: FAMILY MEDICINE | Facility: CLINIC | Age: 44
End: 2024-11-13
Payer: OTHER GOVERNMENT

## 2024-11-13 PROCEDURE — 90471 IMMUNIZATION ADMIN: CPT

## 2024-11-13 PROCEDURE — 90656 IIV3 VACC NO PRSV 0.5 ML IM: CPT

## 2024-12-31 ENCOUNTER — OFFICE VISIT (OUTPATIENT)
Dept: FAMILY MEDICINE | Facility: CLINIC | Age: 44
End: 2024-12-31
Payer: OTHER GOVERNMENT

## 2024-12-31 VITALS
OXYGEN SATURATION: 97 % | TEMPERATURE: 97.5 F | SYSTOLIC BLOOD PRESSURE: 121 MMHG | DIASTOLIC BLOOD PRESSURE: 83 MMHG | WEIGHT: 196 LBS | BODY MASS INDEX: 30.76 KG/M2 | HEART RATE: 83 BPM | HEIGHT: 67 IN | RESPIRATION RATE: 12 BRPM

## 2024-12-31 DIAGNOSIS — E78.00 PURE HYPERCHOLESTEROLEMIA: Primary | ICD-10-CM

## 2024-12-31 DIAGNOSIS — L98.9 SKIN LESION: ICD-10-CM

## 2024-12-31 DIAGNOSIS — Z13.1 SCREENING FOR DIABETES MELLITUS: ICD-10-CM

## 2024-12-31 DIAGNOSIS — R79.89 ELEVATED LFTS: ICD-10-CM

## 2024-12-31 DIAGNOSIS — Z00.00 ROUTINE GENERAL MEDICAL EXAMINATION AT A HEALTH CARE FACILITY: ICD-10-CM

## 2024-12-31 PROCEDURE — 90480 ADMN SARSCOV2 VAC 1/ONLY CMP: CPT | Performed by: PHYSICIAN ASSISTANT

## 2024-12-31 PROCEDURE — 99213 OFFICE O/P EST LOW 20 MIN: CPT | Mod: 25 | Performed by: PHYSICIAN ASSISTANT

## 2024-12-31 PROCEDURE — 99396 PREV VISIT EST AGE 40-64: CPT | Mod: 25 | Performed by: PHYSICIAN ASSISTANT

## 2024-12-31 PROCEDURE — 91320 SARSCV2 VAC 30MCG TRS-SUC IM: CPT | Performed by: PHYSICIAN ASSISTANT

## 2024-12-31 SDOH — HEALTH STABILITY: PHYSICAL HEALTH: ON AVERAGE, HOW MANY DAYS PER WEEK DO YOU ENGAGE IN MODERATE TO STRENUOUS EXERCISE (LIKE A BRISK WALK)?: 1 DAY

## 2024-12-31 SDOH — HEALTH STABILITY: PHYSICAL HEALTH: ON AVERAGE, HOW MANY MINUTES DO YOU ENGAGE IN EXERCISE AT THIS LEVEL?: 30 MIN

## 2024-12-31 ASSESSMENT — SOCIAL DETERMINANTS OF HEALTH (SDOH): HOW OFTEN DO YOU GET TOGETHER WITH FRIENDS OR RELATIVES?: ONCE A WEEK

## 2024-12-31 NOTE — PATIENT INSTRUCTIONS
Patient Education   Preventive Care Advice   This is general advice given by our system to help you stay healthy. However, your care team may have specific advice just for you. Please talk to your care team about your preventive care needs.  Nutrition  Eat 5 or more servings of fruits and vegetables each day.  Try wheat bread, brown rice and whole grain pasta (instead of white bread, rice, and pasta).  Get enough calcium and vitamin D. Check the label on foods and aim for 100% of the RDA (recommended daily allowance).  Lifestyle  Exercise at least 150 minutes each week  (30 minutes a day, 5 days a week).  Do muscle strengthening activities 2 days a week. These help control your weight and prevent disease.  No smoking.  Wear sunscreen to prevent skin cancer.  Have a dental exam and cleaning every 6 months.  Yearly exams  See your health care team every year to talk about:  Any changes in your health.  Any medicines your care team has prescribed.  Preventive care, family planning, and ways to prevent chronic diseases.  Shots (vaccines)   HPV shots (up to age 26), if you've never had them before.  Hepatitis B shots (up to age 59), if you've never had them before.  COVID-19 shot: Get this shot when it's due.  Flu shot: Get a flu shot every year.  Tetanus shot: Get a tetanus shot every 10 years.  Pneumococcal, hepatitis A, and RSV shots: Ask your care team if you need these based on your risk.  Shingles shot (for age 50 and up)  General health tests  Diabetes screening:  Starting at age 35, Get screened for diabetes at least every 3 years.  If you are younger than age 35, ask your care team if you should be screened for diabetes.  Cholesterol test: At age 39, start having a cholesterol test every 5 years, or more often if advised.  Bone density scan (DEXA): At age 50, ask your care team if you should have this scan for osteoporosis (brittle bones).  Hepatitis C: Get tested at least once in your life.  STIs (sexually  transmitted infections)  Before age 24: Ask your care team if you should be screened for STIs.  After age 24: Get screened for STIs if you're at risk. You are at risk for STIs (including HIV) if:  You are sexually active with more than one person.  You don't use condoms every time.  You or a partner was diagnosed with a sexually transmitted infection.  If you are at risk for HIV, ask about PrEP medicine to prevent HIV.  Get tested for HIV at least once in your life, whether you are at risk for HIV or not.  Cancer screening tests  Cervical cancer screening: If you have a cervix, begin getting regular cervical cancer screening tests starting at age 21.  Breast cancer scan (mammogram): If you've ever had breasts, begin having regular mammograms starting at age 40. This is a scan to check for breast cancer.  Colon cancer screening: It is important to start screening for colon cancer at age 45.  Have a colonoscopy test every 10 years (or more often if you're at risk) Or, ask your provider about stool tests like a FIT test every year or Cologuard test every 3 years.  To learn more about your testing options, visit:   .  For help making a decision, visit:   https://bit.ly/lp98267.  Prostate cancer screening test: If you have a prostate, ask your care team if a prostate cancer screening test (PSA) at age 55 is right for you.  Lung cancer screening: If you are a current or former smoker ages 50 to 80, ask your care team if ongoing lung cancer screenings are right for you.  For informational purposes only. Not to replace the advice of your health care provider. Copyright   2023 [employer]. All rights reserved. Clinically reviewed by the [facility] Transitions Program. Rain 665977 - REV 01/24.

## 2024-12-31 NOTE — PROGRESS NOTES
"Preventive Care Visit  [facility] ALEXANDER Ortiz PA-C, Family Medicine  Dec 31, 2024      Assessment & Plan     Pure hypercholesterolemia  Recheck labs today, discussed statin vs calcium score.  Pt would like to see what his labs look like currently  - Lipid panel reflex to direct LDL Fasting    Screening for diabetes mellitus  - Hemoglobin A1c    Elevated LFTs  Will recheck today  - Comprehensive metabolic panel (BMP + Alb, Alk Phos, ALT, AST, Total. Bili, TP)    Routine general medical examination at a health care facility    Skin lesion  On right nose, bleeds periodically  - Adult Dermatology  Referral              BMI  Estimated body mass index is 31.16 kg/m  as calculated from the following:    Height as of this encounter: 1.689 m (5' 6.5\").    Weight as of this encounter: 88.9 kg (196 lb).       Counseling  Appropriate preventive services were addressed with this patient via screening, questionnaire, or discussion as appropriate for fall prevention, nutrition, physical activity, Tobacco-use cessation, social engagement, weight loss and cognition.  Checklist reviewing preventive services available has been given to the patient.  Reviewed patient's diet, addressing concerns and/or questions.   He is at risk for lack of exercise and has been provided with information to increase physical activity for the benefit of his well-being.           Leeroy Mae is a 44 year old, presenting for the following:  Physical        12/31/2024     2:51 PM   Additional Questions   Roomed by Annia ZARAGOZA    Skin lesion right side of nose present for many years, will bleed periodically.  Has never had evaluated in the past     Health Care Directive  Patient does not have a Health Care Directive: Discussed advance care planning with patient; information given to patient to review.      12/31/2024   General Health   How would you rate your overall physical health? (!) FAIR   Feel " stress (tense, anxious, or unable to sleep) Only a little   (!) STRESS CONCERN      12/31/2024   Nutrition   Three or more servings of calcium each day? Yes   Diet: Regular (no restrictions)   How many servings of fruit and vegetables per day? (!) 2-3   How many sweetened beverages each day? 0-1         12/31/2024   Exercise   Days per week of moderate/strenous exercise 1 day   Average minutes spent exercising at this level 30 min   (!) EXERCISE CONCERN      12/31/2024   Social Factors   Frequency of gathering with friends or relatives Once a week   Worry food won't last until get money to buy more No   Food not last or not have enough money for food? No   Do you have housing? (Housing is defined as stable permanent housing and does not include staying ouside in a car, in a tent, in an abandoned building, in an overnight shelter, or couch-surfing.) Yes   Are you worried about losing your housing? No   Lack of transportation? No   Unable to get utilities (heat,electricity)? No         12/31/2024   Dental   Dentist two times every year? Yes         12/31/2024   TB Screening   Were you born outside of the [place]? No         Today's PHQ-2 Score:       12/31/2024     2:32 PM   PHQ-2 ( 1999 [employer])   Q1: Little interest or pleasure in doing things 0   Q2: Feeling down, depressed or hopeless 0   PHQ-2 Score 0    Q1: Little interest or pleasure in doing things Not at all   Q2: Feeling down, depressed or hopeless Not at all   PHQ-2 Score 0       Patient-reported           12/31/2024   Substance Use   Alcohol more than 3/day or more than 7/wk No   Do you use any other substances recreationally? No     Social History     Tobacco Use    Smoking status: Never     Passive exposure: Never    Smokeless tobacco: Never   Vaping Use    Vaping status: Never Used   Substance Use Topics    Alcohol use: Yes     Comment: 5 drinks weekly    Drug use: No           12/31/2024   STI Screening   New sexual partner(s) since last STI/HIV test? (!)  "DECLINE   ASCVD Risk   The 10-year ASCVD risk score (Júnior AGUILAR, et al., 2019) is: 4%    Values used to calculate the score:      Age: 44 years      Sex: Male      Is Non- : No      Diabetic: No      Tobacco smoker: No      Systolic Blood Pressure: 121 mmHg      Is BP treated: No      HDL Cholesterol: 38 mg/dL      Total Cholesterol: 246 mg/dL        12/31/2024   Contraception/Family Planning   Questions about contraception or family planning (!) DECLINE        Reviewed and updated as needed this visit by Provider                          Review of Systems  Constitutional, HEENT, cardiovascular, pulmonary, gi and gu systems are negative, except as otherwise noted.     Objective    Exam  /83   Pulse 83   Temp 97.5  F (36.4  C) (Temporal)   Resp 12   Ht 1.689 m (5' 6.5\")   Wt 88.9 kg (196 lb)   SpO2 97%   BMI 31.16 kg/m     Estimated body mass index is 31.16 kg/m  as calculated from the following:    Height as of this encounter: 1.689 m (5' 6.5\").    Weight as of this encounter: 88.9 kg (196 lb).    Physical Exam  GENERAL: alert and no distress  EYES: Eyes grossly normal to inspection, PERRL and conjunctivae and sclerae normal  HENT: ear canals and TM's normal, nose and mouth without ulcers or lesions  NECK: no adenopathy, no asymmetry, masses, or scars  RESP: lungs clear to auscultation - no rales, rhonchi or wheezes  CV: regular rate and rhythm, normal S1 S2, no S3 or S4, no murmur, click or rub, no peripheral edema  ABDOMEN: soft, nontender, no hepatosplenomegaly, no masses and bowel sounds normal  MS: no gross musculoskeletal defects noted, no edema  SKIN: no suspicious lesions or rashes  NEURO: Normal strength and tone, mentation intact and speech normal  PSYCH: mentation appears normal, affect normal/bright        Signed Electronically by: Naida Ortiz PA-C    "

## 2025-01-02 ENCOUNTER — LAB (OUTPATIENT)
Dept: LAB | Facility: CLINIC | Age: 45
End: 2025-01-02
Payer: COMMERCIAL

## 2025-01-02 DIAGNOSIS — E78.00 PURE HYPERCHOLESTEROLEMIA: ICD-10-CM

## 2025-01-02 DIAGNOSIS — R79.89 ELEVATED LFTS: ICD-10-CM

## 2025-01-02 DIAGNOSIS — Z13.1 SCREENING FOR DIABETES MELLITUS: ICD-10-CM

## 2025-01-02 LAB
ALBUMIN SERPL BCG-MCNC: 4.4 G/DL (ref 3.5–5.2)
ALP SERPL-CCNC: 84 U/L (ref 40–150)
ALT SERPL W P-5'-P-CCNC: 72 U/L (ref 0–70)
ANION GAP SERPL CALCULATED.3IONS-SCNC: 9 MMOL/L (ref 7–15)
AST SERPL W P-5'-P-CCNC: 31 U/L (ref 0–45)
BILIRUB SERPL-MCNC: 0.7 MG/DL
BUN SERPL-MCNC: 15.9 MG/DL (ref 6–20)
CALCIUM SERPL-MCNC: 9.3 MG/DL (ref 8.8–10.4)
CHLORIDE SERPL-SCNC: 104 MMOL/L (ref 98–107)
CHOLEST SERPL-MCNC: 254 MG/DL
CREAT SERPL-MCNC: 0.9 MG/DL (ref 0.67–1.17)
EGFRCR SERPLBLD CKD-EPI 2021: >90 ML/MIN/1.73M2
EST. AVERAGE GLUCOSE BLD GHB EST-MCNC: 108 MG/DL
FASTING STATUS PATIENT QL REPORTED: YES
FASTING STATUS PATIENT QL REPORTED: YES
GLUCOSE SERPL-MCNC: 101 MG/DL (ref 70–99)
HBA1C MFR BLD: 5.4 % (ref 0–5.6)
HCO3 SERPL-SCNC: 26 MMOL/L (ref 22–29)
HDLC SERPL-MCNC: 36 MG/DL
LDLC SERPL CALC-MCNC: 184 MG/DL
NONHDLC SERPL-MCNC: 218 MG/DL
POTASSIUM SERPL-SCNC: 4.2 MMOL/L (ref 3.4–5.3)
PROT SERPL-MCNC: 7.1 G/DL (ref 6.4–8.3)
SODIUM SERPL-SCNC: 139 MMOL/L (ref 135–145)
TRIGL SERPL-MCNC: 168 MG/DL

## 2025-01-07 ENCOUNTER — MYC MEDICAL ADVICE (OUTPATIENT)
Dept: FAMILY MEDICINE | Facility: CLINIC | Age: 45
End: 2025-01-07
Payer: COMMERCIAL

## 2025-01-07 DIAGNOSIS — E78.2 MIXED HYPERLIPIDEMIA: Primary | ICD-10-CM

## 2025-01-14 ENCOUNTER — OFFICE VISIT (OUTPATIENT)
Dept: DERMATOLOGY | Facility: CLINIC | Age: 45
End: 2025-01-14
Attending: PHYSICIAN ASSISTANT
Payer: COMMERCIAL

## 2025-01-14 DIAGNOSIS — D49.2 NEOPLASM OF UNSPECIFIED BEHAVIOR OF BONE, SOFT TISSUE, AND SKIN: Primary | ICD-10-CM

## 2025-01-14 PROCEDURE — 88305 TISSUE EXAM BY PATHOLOGIST: CPT | Mod: 26 | Performed by: DERMATOLOGY

## 2025-01-14 PROCEDURE — 88305 TISSUE EXAM BY PATHOLOGIST: CPT | Mod: TC | Performed by: PHYSICIAN ASSISTANT

## 2025-01-14 ASSESSMENT — PAIN SCALES - GENERAL: PAINLEVEL_OUTOF10: NO PAIN (0)

## 2025-01-14 NOTE — NURSING NOTE
Lidocaine-epinephrine 1-1:003846 % injection   0.2mL once for one use, starting 1/14/2025 ending 1/14/2025,  2mL disp, R-0, injection  Injected by Latosha ULRICH CMA

## 2025-01-14 NOTE — PATIENT INSTRUCTIONS
Wound Care After a Biopsy    What is a skin biopsy?  A skin biopsy allows the doctor to examine a very small piece of tissue under the microscope to determine the diagnosis and the best treatment for the skin condition. A local anesthetic (numbing medicine) is injected with a very small needle into the skin area to be tested. A small piece of skin is taken from the area. Sometimes a suture (stitch) is used.     What are the risks of a skin biopsy?  I will experience scar, bleeding, swelling, pain, crusting and redness. I may experience incomplete removal or recurrence. Risks of this procedure are excessive bleeding, bruising, infection, nerve damage, numbness, thick (hypertrophic or keloidal) scar and non-diagnostic biopsy.    How should I care for my wound for the first 24 hours?  Keep the wound dry and covered for 24 hours  If it bleeds, hold direct pressure on the area for 15 minutes. If bleeding does not stop, call us or go to the emergency room  Avoid strenuous exercise the first 1-2 days or as your doctor instructs you    How should I care for the wound after 24 hours?  After 24 hours, remove the bandage  You may bathe or shower as normal  If you had a scalp biopsy, you can shampoo as usual and can use shower water to clean the biopsy site daily  Clean the wound once a day with gentle soap and water  Do not scrub, be gentle  Apply white petroleum/Vaseline after cleaning the wound with a cotton swab or a clean finger, and keep the site covered with a Bandaid /bandage. Bandages are not necessary with a scalp biopsy  If you are unable to cover the site with a Bandaid /bandage, re-apply ointment 2-3 times a day to keep the site moist. Moisture will help with healing  Avoid strenuous activity for first 1-2 days  Avoid lakes, rivers, pools, and oceans until the stitches are removed or the site is healed    How do I clean my wound?  Wash hands thoroughly with soap or use hand  before all wound care  Clean  the wound with gentle soap and water  Apply white petroleum/Vaseline  to wound after it is clean  Replace the Bandaid /bandage to keep the wound covered for the first few days or as instructed by your doctor  If you had a scalp biopsy, warm shower water to the area on a daily basis should suffice    What should I use to clean my wound?   Cotton-tipped applicators (Qtips )  White petroleum jelly (Vaseline ). Use a clean new container and use Q-tips to apply.  Bandaids  as needed  Gentle soap     How should I care for my wound long term?  Do not get your wound dirty  Keep up with wound care for one week or until the area is healed.  If you have stitches, stitches need to be removed in 14 days. You may return to our clinic for this or you may have it done locally at your doctor s office.  A small scab will form and fall off by itself when the area is completely healed. The area will be red and will become pink in color as it heals. Sun protection is very important for how your scar will turn out. Sunscreen with an SPF 30 or greater is recommended once the area is healed.  You should have some soreness but it should be mild and slowly go away over several days. Talk to your doctor about using tylenol for pain,    When should I call my doctor?  If you have increased:   Pain or swelling  Pus or drainage (clear or slightly yellow drainage is ok)  Temperature over 100F  Spreading redness or warmth around wound    When will I hear about my results?  The biopsy results can take 2 weeks to come back.  Your results will automatically release to [facility] before your provider has even reviewed them.  The clinic will call you with the results, send you a [facility] message, or have you schedule a follow-up clinic or phone time to discuss the results.  Contact our clinics if you do not hear from us in 2 weeks.    Who should I call with questions?  [facility]: [phone]  [facility]: [phone]  For urgent needs outside of business hours call the [facility] at [phone] and ask for the dermatology resident on call

## 2025-01-14 NOTE — LETTER
1/14/2025       RE: Tu Mora  [address] [place] [address]     Dear Colleague,    Thank you for referring your patient, Tu Mora, to the [facility] [place] at [facility]. Please see a copy of my visit note below.    [facility] Dermatology Note  Encounter Date: Jan 14, 2025  Office Visit     Reviewed patients past medical history and pertinent chart review prior to patients visit today.     Dermatology Problem List:  # NUB, right nasal ala, shave biopsy 1/14/2025     ____________________________________________    Assessment & Plan:     # Neoplasm of uncertain behavior:  right nasal ala  DDx includes fibrous papule vs cherry angioma. Shave biopsy today.    Procedure Note: Biopsy by shave technique  The risks and benefits of the procedure were described to the patient. These include but are not limited to bleeding, infection, scar, incomplete removal, and non-diagnostic biopsy. Verbal informed consent was obtained. The above site(s) was cleansed with an alcohol pad and injected with 1% lidocaine with epinephrine. Once anesthesia was obtained, a biopsy(ies) was performed with Gilette blade. The tissue(s) was placed in a labeled container(s) with formalin and sent to pathology. Hemostasis was achieved with aluminum chloride. Vaseline and a bandage were applied to the wound(s). The patient tolerated the procedure well and was given post biopsy care instructions.      All risks, benefits and alternatives were discussed with patient.  Patient is in agreement and understands the assessment and plan.  All questions were answered.  Adenike Trujillo PA-C  [facility]  _______________________________________    CC: Derm Problem (PT has a skin lesion on the right nare. PT reports the lesion will flare up so large they an see it in their vision and will intermittently bleed.)    HPI:    Tu Mora is a(n) 44 year old male who presents today as a new patient for a lesion on the right nose. The lesion is longstanding. The lesion occasionally becomes irritated and bleeds. Patient is otherwise feeling well, without additional skin concerns.      Physical Exam:  SKIN: Focused examination of right nasal ala was performed.  - Involving the right nasal ala is a 0.2 x 0.3 cm pink papule that blanches with pressure.     - No other lesions of concern on areas examined.     Medications:  Current Outpatient Medications   Medication Sig Dispense Refill     fluticasone (FLONASE) 50 MCG/ACT nasal spray Spray 1 spray into both nostrils 2 times daily (Patient not taking: Reported on 1/14/2025) 16 g 0     No current facility-administered medications for this visit.      Past Medical History:   Patient Active Problem List   Diagnosis     Esophageal reflux     Hallux valgus, unspecified laterality     Physical therapy evaluation, initial     Hemorrhoids     Noncompliance with treatment     Osgood-Schlatter's disease     Pain of foot     Plica syndrome     Pure hypercholesterolemia     Testicular pain     Thrombosed external hemorrhoids     Post-traumatic stress disorder     Arthralgia of multiple joints     Depression     Plantar fasciitis     Acquired pes planus     Past Medical History:   Diagnosis Date     GERD (gastroesophageal reflux disease) 10/03/2013       CC Naida Ortiz PA-C  [address] [place] [address] on close of this encounter.       Again, thank you for allowing me to participate in the care of your patient.      Sincerely,    Adenike Trujillo PA-C

## 2025-01-14 NOTE — NURSING NOTE
Dermatology Rooming Note    Tu Mora's goals for this visit include:   Chief Complaint   Patient presents with    Derm Problem     PT has a skin lesion on the right nare. PT reports the lesion will flare up so large they an see it in their vision and will intermittently bleed.     Ronald Hadley - EMT

## 2025-01-14 NOTE — PROGRESS NOTES
[facility] Dermatology Note  Encounter Date: Jan 14, 2025  Office Visit     Reviewed patients past medical history and pertinent chart review prior to patients visit today.     Dermatology Problem List:  # NUB, right nasal ala, shave biopsy 1/14/2025     ____________________________________________    Assessment & Plan:     # Neoplasm of uncertain behavior:  right nasal ala  DDx includes fibrous papule vs cherry angioma. Shave biopsy today.    Procedure Note: Biopsy by shave technique  The risks and benefits of the procedure were described to the patient. These include but are not limited to bleeding, infection, scar, incomplete removal, and non-diagnostic biopsy. Verbal informed consent was obtained. The above site(s) was cleansed with an alcohol pad and injected with 1% lidocaine with epinephrine. Once anesthesia was obtained, a biopsy(ies) was performed with Gilette blade. The tissue(s) was placed in a labeled container(s) with formalin and sent to pathology. Hemostasis was achieved with aluminum chloride. Vaseline and a bandage were applied to the wound(s). The patient tolerated the procedure well and was given post biopsy care instructions.      All risks, benefits and alternatives were discussed with patient.  Patient is in agreement and understands the assessment and plan.  All questions were answered.  Adenike Trujillo PA-C  [facility]  _______________________________________    CC: Derm Problem (PT has a skin lesion on the right nare. PT reports the lesion will flare up so large they an see it in their vision and will intermittently bleed.)    HPI:  Mr. Tu Mora is a(n) 44 year old male who presents today as a new patient for a lesion on the right nose. The lesion is longstanding. The lesion occasionally becomes irritated and bleeds. Patient is otherwise feeling well, without additional skin concerns.      Physical Exam:  SKIN: Focused examination of right  nasal ala was performed.  - Involving the right nasal ala is a 0.2 x 0.3 cm pink papule that blanches with pressure.     - No other lesions of concern on areas examined.     Medications:  Current Outpatient Medications   Medication Sig Dispense Refill    fluticasone (FLONASE) 50 MCG/ACT nasal spray Spray 1 spray into both nostrils 2 times daily (Patient not taking: Reported on 1/14/2025) 16 g 0     No current facility-administered medications for this visit.      Past Medical History:   Patient Active Problem List   Diagnosis    Esophageal reflux    Hallux valgus, unspecified laterality    Physical therapy evaluation, initial    Hemorrhoids    Noncompliance with treatment    Osgood-Schlatter's disease    Pain of foot    Plica syndrome    Pure hypercholesterolemia    Testicular pain    Thrombosed external hemorrhoids    Post-traumatic stress disorder    Arthralgia of multiple joints    Depression    Plantar fasciitis    Acquired pes planus     Past Medical History:   Diagnosis Date    GERD (gastroesophageal reflux disease) 10/03/2013       CC Naida Ortiz PA-C  [address] [place] [address] on close of this encounter.

## 2025-01-16 LAB
PATH REPORT.COMMENTS IMP SPEC: NORMAL
PATH REPORT.COMMENTS IMP SPEC: NORMAL
PATH REPORT.FINAL DX SPEC: NORMAL
PATH REPORT.GROSS SPEC: NORMAL
PATH REPORT.MICROSCOPIC SPEC OTHER STN: NORMAL
PATH REPORT.RELEVANT HX SPEC: NORMAL

## 2025-02-17 ENCOUNTER — TELEPHONE (OUTPATIENT)
Dept: FAMILY MEDICINE | Facility: CLINIC | Age: 45
End: 2025-02-17
Payer: COMMERCIAL

## 2025-02-17 NOTE — TELEPHONE ENCOUNTER
02/17/25  Attempted to reach pt. No answer and VM not set up. Please relay provider message.  Maxine

## 2025-02-17 NOTE — TELEPHONE ENCOUNTER
Pt did not view [facility], please let him know the following:    Here are my comments about the recent results.     Your calcium score is 2 which means you are at a slight increased risk of cardiac events in the next 10 years.  The recommendation is to either see cardiology or to start a cholesterol medication called crestor and recheck your calcium score again in the future.  Please let me know which you would prefer.     Please let us know if you have any questions or concerns.     Regards,  Naida Ortiz PA-C

## 2025-02-18 NOTE — TELEPHONE ENCOUNTER
02/18/25  Relayed message to pt.     FYI to provider. Pt stated he will get on MyChart and respond directly with which route he would like to pursue.  Maxine

## (undated) DEVICE — ENDO TRAP POLYP E-TRAP 00711099

## (undated) DEVICE — SNARE CAPIVATOR ROUND COLD SNR BX10 M00561101

## (undated) DEVICE — SPECIMEN CONTAINER 3OZ W/FORMALIN 59901

## (undated) DEVICE — SOL WATER IRRIG 500ML BOTTLE 2F7113

## (undated) DEVICE — SUCTION MANIFOLD NEPTUNE 2 SYS 1 PORT 702-025-000

## (undated) DEVICE — GOWN IMPERVIOUS 2XL BLUE

## (undated) DEVICE — KIT ENDO TURNOVER/PROCEDURE CARRY-ON 101822

## (undated) DEVICE — TUBING SUCTION MEDI-VAC 1/4"X20' N620A